# Patient Record
Sex: FEMALE | Race: WHITE | Employment: OTHER | URBAN - METROPOLITAN AREA
[De-identification: names, ages, dates, MRNs, and addresses within clinical notes are randomized per-mention and may not be internally consistent; named-entity substitution may affect disease eponyms.]

---

## 2017-03-03 ENCOUNTER — DOCUMENTATION ONLY (OUTPATIENT)
Dept: OTHER | Facility: CLINIC | Age: 72
End: 2017-03-03

## 2017-04-18 ENCOUNTER — IMPORTED ENCOUNTER (OUTPATIENT)
Dept: URBAN - METROPOLITAN AREA CLINIC 31 | Facility: CLINIC | Age: 72
End: 2017-04-18

## 2017-04-18 PROBLEM — H25.813: Noted: 2017-04-18

## 2017-04-18 PROBLEM — H10.403: Noted: 2017-04-18

## 2017-04-18 PROBLEM — L71.9: Noted: 2017-04-18

## 2017-04-18 PROCEDURE — 92014 COMPRE OPH EXAM EST PT 1/>: CPT

## 2017-04-18 PROCEDURE — 92015 DETERMINE REFRACTIVE STATE: CPT

## 2017-04-18 NOTE — PATIENT DISCUSSION
1.  Facial Rosacea - Patient found to have skin changes consistsnt with Rosacea. No sign of ocular complications related to rosacea at this time. Consider evaluation by dermatology. 2. Combined Types of Cataract OU: Explained how cataracts can effect vision. Recommend clinical observation. The patient was advised to contact us if any change or worsening of vision. 3. Allergic Conjunctivitis OU -- The condition was  discussed with the patient. Avoidance of allergens and cool compresses were recommended. Continue Zaditor PRN. 4. Consider rx to improve distance for PRN. 5. Return for an appointment in 1 year for comprehensive exam. with Dr. Edward Claros.

## 2017-05-24 NOTE — PROGRESS NOTES
SUBJECTIVE:                                                    Stephanie Kathleen is a 72 year old female who presents to clinic today for the following health issues:        Depression and Anxiety Follow-Up    Status since last visit: No change    Other associated symptoms:None    Complicating factors:     Significant life event: daughter Dx with MS she has lost her vision      Current substance abuse: None    PHQ-9 SCORE 9/8/2015 3/27/2016 7/8/2016   Total Score - - -   Total Score MyChart 1 5 (Mild depression) 4 (Minimal depression)     HAAKN-7 SCORE 3/13/2012 9/8/2015   Total Score 5 -   Total Score - 0        PHQ-9  English      PHQ-9   Any Language     GAD7     Lump on palm of right hand         Amount of exercise or physical activity: None, active     Problems taking medications regularly: No    Medication side effects: none    Diet: regular (no restrictions)      Hyperlipidemia Follow-Up      Rate your low fat/cholesterol diet?: good    Taking statin?  Yes, no muscle aches from statin    Other lipid medications/supplements?:  none     Hypertension Follow-up      Outpatient blood pressures are not being checked.    Low Salt Diet: no added salt    Hypertension ROS: taking medications as instructed, no medication side effects noted, no TIA's, no chest pain on exertion, no dyspnea on exertion, no swelling of ankles.  No headache or visual changes.      C/o right thigh pain in the muscle x 6 months, worsens with activity.  Can golf 9 holes, but pain starts by 10th hole, takes about an hour of rest for pain to resolve.  Does not resemble pain she had from statins.        Problem list and histories reviewed & adjusted, as indicated.  Additional history: as documented    Patient Active Problem List   Diagnosis     MS (multiple sclerosis) (H)     Mild major depression (H)     Basal cell carcinoma of skin     Hypertension goal BP (blood pressure) < 150/90     Anxiety     Adenocarcinoma of lung (H)     Squamous cell  carcinoma of lung (H)     Hyperlipidemia LDL goal <130     Pulmonary nodules     Centrilobular emphysema (H)     Advance Care Planning     Morbid obesity (H)     Past Surgical History:   Procedure Laterality Date     ABDOMEN SURGERY  1973,1975    C-sections     BIOPSY       C APPENDECTOMY       C TOTAL KNEE ARTHROPLASTY  01/2007    left knee     C TOTAL KNEE ARTHROPLASTY  1/10    right knee     C/SECTION, LOW TRANSVERSE      x 2     CARPAL TUNNEL RELEASE RT/LT      bilateral     CHOLECYSTECTOMY  2010     COLONOSCOPY  7/1/2013    Procedure: COLONOSCOPY;  colonoscopy       LOBECTOMY  02/2008    lower lobe       Social History   Substance Use Topics     Smoking status: Former Smoker     Packs/day: 1.00     Years: 15.00     Types: Cigarettes     Quit date: 5/12/1999     Smokeless tobacco: Never Used      Comment: quit in 1999     Alcohol use No     Family History   Problem Relation Age of Onset     CANCER Mother      ovarian     Hypertension Father      CANCER Father      Cardiovascular Father      HEART DISEASE Father      Depression Father      Arthritis Sister      CANCER Sister      ovarian and breast     Obesity Sister      Hypertension Sister      Alcohol/Drug Daughter      Depression Daughter      Arthritis Sister      CANCER Sister      Alcohol/Drug Daughter      Depression Daughter      CANCER Paternal Grandfather      lung     Obesity Paternal Grandfather      Obesity Paternal Grandmother      Breast Cancer Sister      Melanoma No family hx of          Current Outpatient Prescriptions   Medication Sig Dispense Refill     losartan-hydrochlorothiazide (HYZAAR) 100-25 MG per tablet Take 1 tablet by mouth daily 90 tablet 1     pravastatin (PRAVACHOL) 10 MG tablet Take 1 tablet every 3rd day 30 tablet 3     sertraline (ZOLOFT) 50 MG tablet Take 1 tablet (50 mg) by mouth daily 90 tablet 1     [DISCONTINUED] losartan-hydrochlorothiazide (HYZAAR) 100-25 MG per tablet Take 1 tablet by mouth daily 90 tablet 1      [DISCONTINUED] pravastatin (PRAVACHOL) 10 MG tablet Take 1 tablet every 3rd day 30 tablet 3     [DISCONTINUED] sertraline (ZOLOFT) 50 MG tablet Take 1 tablet (50 mg) by mouth daily 90 tablet 1     [DISCONTINUED] FLUoxetine (PROZAC) 40 MG capsule Take 1 capsule (40 mg) by mouth daily Need to keep upcoming appointment for further refills 30 capsule 0     BP Readings from Last 3 Encounters:   06/01/17 138/78   12/13/16 140/82   08/17/16 124/75    Wt Readings from Last 3 Encounters:   06/01/17 231 lb (104.8 kg)   12/13/16 231 lb (104.8 kg)   05/12/16 230 lb (104.3 kg)                  Labs reviewed in EPIC    Reviewed and updated as needed this visit by clinical staff       Reviewed and updated as needed this visit by Provider         {PROVIDER CHARTING PREFERENCE:[id number]}

## 2017-06-01 ENCOUNTER — TELEPHONE (OUTPATIENT)
Dept: OTHER | Facility: CLINIC | Age: 72
End: 2017-06-01

## 2017-06-01 ENCOUNTER — OFFICE VISIT (OUTPATIENT)
Dept: FAMILY MEDICINE | Facility: CLINIC | Age: 72
End: 2017-06-01
Payer: MEDICARE

## 2017-06-01 VITALS
SYSTOLIC BLOOD PRESSURE: 138 MMHG | BODY MASS INDEX: 38.49 KG/M2 | DIASTOLIC BLOOD PRESSURE: 78 MMHG | TEMPERATURE: 98.2 F | WEIGHT: 231 LBS | HEART RATE: 81 BPM | HEIGHT: 65 IN

## 2017-06-01 DIAGNOSIS — M79.651 PAIN OF RIGHT THIGH: ICD-10-CM

## 2017-06-01 DIAGNOSIS — M79.641 BILATERAL HAND PAIN: ICD-10-CM

## 2017-06-01 DIAGNOSIS — F33.0 MAJOR DEPRESSIVE DISORDER, RECURRENT EPISODE, MILD (H): Primary | ICD-10-CM

## 2017-06-01 DIAGNOSIS — I10 HYPERTENSION GOAL BP (BLOOD PRESSURE) < 150/90: ICD-10-CM

## 2017-06-01 DIAGNOSIS — E78.5 HYPERLIPIDEMIA LDL GOAL <100: ICD-10-CM

## 2017-06-01 DIAGNOSIS — I73.9 PAD (PERIPHERAL ARTERY DISEASE) (H): Primary | ICD-10-CM

## 2017-06-01 DIAGNOSIS — M79.642 BILATERAL HAND PAIN: ICD-10-CM

## 2017-06-01 PROBLEM — E66.01 MORBID OBESITY (H): Status: ACTIVE | Noted: 2017-06-01

## 2017-06-01 LAB
ANION GAP SERPL CALCULATED.3IONS-SCNC: 7 MMOL/L (ref 3–14)
BUN SERPL-MCNC: 13 MG/DL (ref 7–30)
CALCIUM SERPL-MCNC: 9.2 MG/DL (ref 8.5–10.1)
CHLORIDE SERPL-SCNC: 104 MMOL/L (ref 94–109)
CHOLEST SERPL-MCNC: 200 MG/DL
CO2 SERPL-SCNC: 30 MMOL/L (ref 20–32)
CREAT SERPL-MCNC: 0.91 MG/DL (ref 0.52–1.04)
GFR SERPL CREATININE-BSD FRML MDRD: 61 ML/MIN/1.7M2
GLUCOSE SERPL-MCNC: 97 MG/DL (ref 70–99)
HDLC SERPL-MCNC: 49 MG/DL
LDLC SERPL CALC-MCNC: 127 MG/DL
NONHDLC SERPL-MCNC: 151 MG/DL
POTASSIUM SERPL-SCNC: 4.5 MMOL/L (ref 3.4–5.3)
SODIUM SERPL-SCNC: 141 MMOL/L (ref 133–144)
TRIGL SERPL-MCNC: 119 MG/DL

## 2017-06-01 PROCEDURE — 99214 OFFICE O/P EST MOD 30 MIN: CPT | Performed by: FAMILY MEDICINE

## 2017-06-01 PROCEDURE — 80061 LIPID PANEL: CPT | Performed by: FAMILY MEDICINE

## 2017-06-01 PROCEDURE — 80048 BASIC METABOLIC PNL TOTAL CA: CPT | Performed by: FAMILY MEDICINE

## 2017-06-01 PROCEDURE — 36415 COLL VENOUS BLD VENIPUNCTURE: CPT | Performed by: FAMILY MEDICINE

## 2017-06-01 RX ORDER — LOSARTAN POTASSIUM AND HYDROCHLOROTHIAZIDE 25; 100 MG/1; MG/1
1 TABLET ORAL DAILY
Qty: 90 TABLET | Refills: 1 | Status: SHIPPED | OUTPATIENT
Start: 2017-06-01 | End: 2018-01-09

## 2017-06-01 RX ORDER — PRAVASTATIN SODIUM 10 MG
TABLET ORAL
Qty: 30 TABLET | Refills: 3 | Status: SHIPPED | OUTPATIENT
Start: 2017-06-01 | End: 2018-06-18

## 2017-06-01 ASSESSMENT — ANXIETY QUESTIONNAIRES
2. NOT BEING ABLE TO STOP OR CONTROL WORRYING: NOT AT ALL
1. FEELING NERVOUS, ANXIOUS, OR ON EDGE: NOT AT ALL
3. WORRYING TOO MUCH ABOUT DIFFERENT THINGS: SEVERAL DAYS
5. BEING SO RESTLESS THAT IT IS HARD TO SIT STILL: NOT AT ALL
IF YOU CHECKED OFF ANY PROBLEMS ON THIS QUESTIONNAIRE, HOW DIFFICULT HAVE THESE PROBLEMS MADE IT FOR YOU TO DO YOUR WORK, TAKE CARE OF THINGS AT HOME, OR GET ALONG WITH OTHER PEOPLE: NOT DIFFICULT AT ALL
GAD7 TOTAL SCORE: 2
6. BECOMING EASILY ANNOYED OR IRRITABLE: NOT AT ALL
7. FEELING AFRAID AS IF SOMETHING AWFUL MIGHT HAPPEN: SEVERAL DAYS

## 2017-06-01 ASSESSMENT — PATIENT HEALTH QUESTIONNAIRE - PHQ9: 5. POOR APPETITE OR OVEREATING: NOT AT ALL

## 2017-06-01 NOTE — MR AVS SNAPSHOT
After Visit Summary   6/1/2017    Stephanie Kathleen    MRN: [id number]           Patient Information     Date Of Birth          1945        Visit Information        Provider Department      6/1/2017 9:15 AM Stephanie Olsen MD [facility]        Today's Diagnoses     Major depressive disorder, recurrent episode, mild (H)    -  1    Hypertension goal BP (blood pressure) < 150/90        Hyperlipidemia LDL goal <100        Bilateral hand pain        Pain of right thigh          Care Instructions    Next visit in 6 months for wellness exam      Make appointments with rheumatology and with vascular specialist.                Follow-ups after your visit        Additional Services     RHEUMATOLOGY REFERRAL       Your provider has referred you to: [facility]: [facility] Oma Lux [phone]   [email or web address]    Please be aware that coverage of these services is subject to the terms and limitations of your health insurance plan.  Call member services at your health plan with any benefit or coverage questions.      Please bring the following with you to your appointment:    (1) Any X-Rays, CTs or MRIs which have been performed.  Contact the facility where they were done to arrange for  prior to your scheduled appointment.    (2) List of current medications   (3) This referral request   (4) Any documents/labs given to you for this referral            VASCULAR REFERRAL       Your provider has referred [facility] to [facility].    Reason for Referral: Peripheral Intervention    Urgency of Referral: Next available    Please be aware that coverage of these services is subject to the terms and limitations of your health insurance plan.  Call member services at your health plan with any benefit or coverage questions.      Please bring the following with you to your appointment:  (1) Any X-Rays, CTs or MRIs which have been performed.  Contact  the facility where they were done to arrange for  prior to your scheduled appointment.  Any new CT, MRI or other procedures ordered by your specialist must be performed at a [facility] facility or coordinated by your clinic's referral office.    (2) List of current medications   (3) This referral request   (4) Any documents/labs given to you for this referral                  Your next 10 appointments already scheduled     Jun 06, 2017 11:15 AM CDT   Return Visit with Carlos Hickman MD   [facility]    [address] [id number] [phone]              Who to contact     If you have questions or need follow up information about today's clinic visit or your schedule please contact [facility] directly at [phone].  Normal or non-critical lab and imaging results will be communicated to you by MyChart, letter or phone within 4 business days after the clinic has received the results. If you do not hear from us within 7 days, please contact the clinic through MyChart or phone. If you have a critical or abnormal lab result, we will notify you by phone as soon as possible.  Submit refill requests through [employer] or call your pharmacy and they will forward the refill request to us. Please allow 3 business days for your refill to be completed.          Additional Information About Your Visit        [facility] Information     [employer] gives you secure access to your electronic health record. If you see a primary care provider, you can also send messages to your care team and make appointments. If you have questions, please call your primary care clinic.  If you do not have a primary care provider, please call [phone] and they will assist you.        Care EveryWhere ID     This is your Care EveryWhere ID. This could be used by other organizations to access your [facility] medical records  [id number]        Your Vitals Were     Pulse  "Temperature Height BMI (Body Mass Index)          81 98.2  F (36.8  C) (Oral) 5' 4.5\" (1.638 m) 39.04 kg/m2         Blood Pressure from Last 3 Encounters:   06/01/17 138/78   12/13/16 140/82   08/17/16 124/75    Weight from Last 3 Encounters:   06/01/17 231 lb (104.8 kg)   12/13/16 231 lb (104.8 kg)   05/12/16 230 lb (104.3 kg)              We Performed the Following     Basic metabolic panel     DEPRESSION ACTION PLAN (DAP)     Lipid panel reflex to direct LDL     RHEUMATOLOGY REFERRAL     VASCULAR REFERRAL          Where to get your medicines      Some of these will need a paper prescription and others can be bought over the counter.  Ask your nurse if you have questions.     Bring a paper prescription for each of these medications     losartan-hydrochlorothiazide 100-25 MG per tablet    pravastatin 10 MG tablet    sertraline 50 MG tablet          Primary Care Provider Office Phone # Fax #    Stephanie Olsen [phone]       [facility] [address] [facility] [address]        Thank you!     Thank you for choosing [facility]  for your care. Our goal is always to provide you with excellent care. Hearing back from our patients is one way we can continue to improve our services. Please take a few minutes to complete the written survey that you may receive in the mail after your visit with us. Thank you!             Your Updated Medication List - Protect others around you: Learn how to safely use, store and throw away your medicines at www.disposemymeds.org.          This list is accurate as of: 6/1/17  9:48 AM.  Always use your most recent med list.                   Brand Name Dispense Instructions for use    losartan-hydrochlorothiazide 100-25 MG per tablet    HYZAAR    90 tablet    Take 1 tablet by mouth daily       pravastatin 10 MG tablet    PRAVACHOL    30 tablet    Take 1 tablet every 3rd day       sertraline 50 MG tablet    ZOLOFT    90 tablet    Take 1 " tablet (50 mg) by mouth daily

## 2017-06-01 NOTE — LETTER
My Depression Action Plan  Name: Stephanie Kathleen   Date of Birth 1945  Date: 5/24/2017    My doctor: Stephanie Olsen   My clinic: [facility]  [address] [place] [id number] [phone]          GREEN    ZONE   Good Control    What it looks like:     Things are going generally well. You have normal up s and down s. You may even feel depressed from time to time, but bad moods usually last less than a day.   What you need to do:  1. Continue to care for yourself (see self care plan)  2. Check your depression survival kit and update it as needed  3. Follow your physician s recommendations including any medication.  4. Do not stop taking medication unless you consult with your physician first.           YELLOW         ZONE Getting Worse    What it looks like:     Depression is starting to interfere with your life.     It may be hard to get out of bed; you may be starting to isolate yourself from others.    Symptoms of depression are starting to last most all day and this has happened for several days.     You may have suicidal thoughts but they are not constant.   What you need to do:     1. Call your care team, your response to treatment will improve if you keep your care team informed of your progress. Yellow periods are signs an adjustment may need to be made.     2. Continue your self-care, even if you have to fake it!    3. Talk to someone in your support network    4. Open up your depression survival kit           RED    ZONE Medical Alert - Get Help    What it looks like:     Depression is seriously interfering with your life.     You may experience these or other symptoms: You can t get out of bed most days, can t work or engage in other necessary activities, you have trouble taking care of basic hygiene, or basic responsibilities, thoughts of suicide or death that will not go away, self-injurious behavior.     What you need to do:  1. Call your care team and  request a same-day appointment. If they are not available (weekends or after hours) call your local crisis line, emergency room or 911.      Electronically signed by: Sujey Lancaster, May 24, 2017    Depression Self Care Plan / Survival Kit    Self-Care for Depression  Here s the deal. Your body and mind are really not as separate as most people think.  What you do and think affects how you feel and how you feel influences what you do and think. This means if you do things that people who feel good do, it will help you feel better.  Sometimes this is all it takes.  There is also a place for medication and therapy depending on how severe your depression is, so be sure to consult with your medical provider and/ or Behavioral Health Consultant if your symptoms are worsening or not improving.     In order to better manage my stress, I will:    Exercise  Get some form of exercise, every day. This will help reduce pain and release endorphins, the  feel good  chemicals in your brain. This is almost as good as taking antidepressants!  This is not the same as joining a gym and then never going! (they count on that by the way ) It can be as simple as just going for a walk or doing some gardening, anything that will get you moving.      Hygiene   Maintain good hygiene (Get out of bed in the morning, Make your bed, Brush your teeth, Take a shower, and Get dressed like you were going to work, even if you are unemployed).  If your clothes don't fit try to get ones that do.    Diet  I will strive to eat foods that are good for me, drink plenty of water, and avoid excessive sugar, caffeine, alcohol, and other mood-altering substances.  Some foods that are helpful in depression are: complex carbohydrates, B vitamins, flaxseed, fish or fish oil, fresh fruits and vegetables.    Psychotherapy  I agree to participate in Individual Therapy (if recommended).    Medication  If prescribed medications, I agree to take them.  Missing doses  can result in serious side effects.  I understand that drinking alcohol, or other illicit drug use, may cause potential side effects.  I will not stop my medication abruptly without first discussing it with my provider.    Staying Connected With Others  I will stay in touch with my friends, family members, and my primary care provider/team.    Use your imagination  Be creative.  We all have a creative side; it doesn t matter if it s oil painting, sand castles, or mud pies! This will also kick up the endorphins.    Witness Beauty  (AKA stop and smell the roses) Take a look outside, even in mid-winter. Notice colors, textures. Watch the squirrels and birds.     Service to others  Be of service to others.  There is always someone else in need.  By helping others we can  get out of ourselves  and remember the really important things.  This also provides opportunities for practicing all the other parts of the program.    Humor  Laugh and be silly!  Adjust your TV habits for less news and crime-drama and more comedy.    Control your stress  Try breathing deep, massage therapy, biofeedback, and meditation. Find time to relax each day.     My support system    Clinic Contact:  Phone number:    Contact 1:  Phone number:    Contact 2:  Phone number:    [facility]/:  Phone number:    Therapist:  Phone number:    Local crisis center:    Phone number:    Other community support:  Phone number:

## 2017-06-01 NOTE — TELEPHONE ENCOUNTER
Pt referred to [facility] by  for right thigh pain with activity, better with rest.    Pt needs to be scheduled for MARY ELLEN with exercise and consult with IR or Vascular Surgery.  Will route to scheduling to coordinate an appointment next available.    Danielle Vegas RN BSN

## 2017-06-01 NOTE — PATIENT INSTRUCTIONS
Next visit in 6 months for wellness exam      Make appointments with rheumatology and with vascular specialist.

## 2017-06-01 NOTE — NURSING NOTE
"Chief Complaint   Patient presents with     Depression     Anxiety     Derm Problem       Initial /78  Pulse 81  Temp 98.2  F (36.8  C) (Oral)  Ht 5' 4.5\" (1.638 m)  Wt 231 lb (104.8 kg)  BMI 39.04 kg/m2 Estimated body mass index is 39.04 kg/(m^2) as calculated from the following:    Height as of this encounter: 5' 4.5\" (1.638 m).    Weight as of this encounter: 231 lb (104.8 kg).  Medication Reconciliation: complete  Sujey Pierson , DANICA     "

## 2017-06-02 ASSESSMENT — ANXIETY QUESTIONNAIRES: GAD7 TOTAL SCORE: 2

## 2017-06-02 ASSESSMENT — PATIENT HEALTH QUESTIONNAIRE - PHQ9: SUM OF ALL RESPONSES TO PHQ QUESTIONS 1-9: 3

## 2017-06-02 NOTE — TELEPHONE ENCOUNTER
Left message with patient's  who took my name and number. Patient will call back today or Monday to schedule.

## 2017-06-06 ENCOUNTER — TELEPHONE (OUTPATIENT)
Dept: DERMATOLOGY | Facility: CLINIC | Age: 72
End: 2017-06-06

## 2017-06-06 ENCOUNTER — OFFICE VISIT (OUTPATIENT)
Dept: DERMATOLOGY | Facility: CLINIC | Age: 72
End: 2017-06-06
Payer: MEDICARE

## 2017-06-06 VITALS — DIASTOLIC BLOOD PRESSURE: 85 MMHG | SYSTOLIC BLOOD PRESSURE: 151 MMHG | OXYGEN SATURATION: 95 % | HEART RATE: 69 BPM

## 2017-06-06 DIAGNOSIS — B07.8 COMMON WART: Primary | ICD-10-CM

## 2017-06-06 DIAGNOSIS — L82.1 SK (SEBORRHEIC KERATOSIS): ICD-10-CM

## 2017-06-06 DIAGNOSIS — L82.0 INFLAMED SEBORRHEIC KERATOSIS: ICD-10-CM

## 2017-06-06 DIAGNOSIS — C44.319 BASAL CELL CARCINOMA, FOREHEAD: ICD-10-CM

## 2017-06-06 DIAGNOSIS — L81.4 LENTIGO: ICD-10-CM

## 2017-06-06 DIAGNOSIS — Z85.828 HISTORY OF SKIN CANCER: ICD-10-CM

## 2017-06-06 DIAGNOSIS — C44.01 BASAL CELL CARCINOMA, LIP: ICD-10-CM

## 2017-06-06 PROCEDURE — 11101 HC DESTRUCT BENIGN LESION, UP TO 14: CPT | Performed by: DERMATOLOGY

## 2017-06-06 PROCEDURE — 17110 DESTRUCTION B9 LES UP TO 14: CPT | Performed by: DERMATOLOGY

## 2017-06-06 PROCEDURE — 99213 OFFICE O/P EST LOW 20 MIN: CPT | Mod: 25 | Performed by: DERMATOLOGY

## 2017-06-06 PROCEDURE — 88331 PATH CONSLTJ SURG 1 BLK 1SPC: CPT | Performed by: DERMATOLOGY

## 2017-06-06 PROCEDURE — 40490 BIOPSY OF LIP: CPT | Performed by: DERMATOLOGY

## 2017-06-06 PROCEDURE — 11100 CL FROZEN SECTION FIRST SPEC: CPT | Mod: 59 | Performed by: DERMATOLOGY

## 2017-06-06 NOTE — PROGRESS NOTES
Stephanie Kathleen is a 72 year old year old female patient here today for bleeding spot on forehead and lip, tender spot on right knee, tender wart on right hand.   .  Patient states this has been present for months.  Patient reports the following symptoms:  bleeding.  Patient reports the following previous treatments none.  Patient reports the following modifying factors none.  Associated symptoms: none.  Patient has no other skin complaints today.  Remainder of the HPI, Meds, PMH, Allergies, FH, and SH was reviewed in chart.    Pertinent Hx:   Non-melanoma skin cancer   Past Medical History:   Diagnosis Date     Arthritis      Basal cell carcinoma      COPD (chronic obstructive pulmonary disease) (H)      Depression      Depressive disorder     MS related     HTN      Hyperlipidemia      Lung cancer (H)      MS (multiple sclerosis) (H)      Squamous cell carcinoma (H)      TIA (transient ischaemic attack)        Past Surgical History:   Procedure Laterality Date     ABDOMEN SURGERY  1973,1975    C-sections     BIOPSY       C APPENDECTOMY       C TOTAL KNEE ARTHROPLASTY  01/2007    left knee     C TOTAL KNEE ARTHROPLASTY  1/10    right knee     C/SECTION, LOW TRANSVERSE      x 2     CARPAL TUNNEL RELEASE RT/LT      bilateral     CHOLECYSTECTOMY  2010     COLONOSCOPY  7/1/2013    Procedure: COLONOSCOPY;  colonoscopy       LOBECTOMY  02/2008    lower lobe        Family History   Problem Relation Age of Onset     CANCER Mother      ovarian     Hypertension Father      CANCER Father      Cardiovascular Father      HEART DISEASE Father      Depression Father      Arthritis Sister      CANCER Sister      ovarian and breast     Obesity Sister      Hypertension Sister      Alcohol/Drug Daughter      Depression Daughter      Arthritis Sister      CANCER Sister      Alcohol/Drug Daughter      Depression Daughter      CANCER Paternal Grandfather      lung     Obesity Paternal Grandfather      Obesity Paternal Grandmother       Breast Cancer Sister      Melanoma No family hx of        Social History     Social History     Marital status:      Spouse name: N/A     Number of children: N/A     Years of education: N/A     Occupational History      Retired     Social History Main Topics     Smoking status: Former Smoker     Packs/day: 1.00     Years: 15.00     Types: Cigarettes     Quit date: 5/12/1999     Smokeless tobacco: Never Used      Comment: quit in 1999     Alcohol use No     Drug use: No     Sexual activity: No     Other Topics Concern     Parent/Sibling W/ Cabg, Mi Or Angioplasty Before 65f 55m? No     Social History Narrative       Outpatient Encounter Prescriptions as of 6/6/2017   Medication Sig Dispense Refill     losartan-hydrochlorothiazide (HYZAAR) 100-25 MG per tablet Take 1 tablet by mouth daily 90 tablet 1     pravastatin (PRAVACHOL) 10 MG tablet Take 1 tablet every 3rd day 30 tablet 3     sertraline (ZOLOFT) 50 MG tablet Take 1 tablet (50 mg) by mouth daily 90 tablet 1     No facility-administered encounter medications on file as of 6/6/2017.              Review Of Systems  Skin: As above  Eyes: negative  Ears/Nose/Throat: negative  Respiratory: No shortness of breath, dyspnea on exertion, cough, or hemoptysis  Cardiovascular: negative  Gastrointestinal: negative  Genitourinary: negative  Musculoskeletal: negative  Neurologic: negative  Psychiatric: negative  Hematologic/Lymphatic/Immunologic: negative  Endocrine: negative      O:   NAD, WDWN, Alert & Oriented, Mood & Affect wnl, Vitals stable   Here today alone   /85 (BP Location: Right arm, Patient Position: Chair, Cuff Size: Adult Regular)  Pulse 69  SpO2 95%   General appearance normal   Vitals stable   Alert, oriented and in no acute distress      Following lymph nodes palpated: Occipital, Cervical, Supraclavicular no lad   R 3rd finge rverrucous papule   Stuck on papules and brown macules on trunk and ext    R forehead 9mm pink pearly papule   L lip at  vermillion 4mm pink pearly papule   R knee tender 1cm scaly papule           The remainder of the full exam was unremarkable; the following areas were examined:  conjunctiva/lids, oral mucosa, neck, peripheral vascular system, abdomen, lymph nodes, digits/nails, eccrine and apocrine glands, scalp/hair, face, neck, chest, abdomen, buttocks, back, RUE, LUE, RLE, LLE       Eyes: Conjunctivae/lids:Normal     ENT: Lips, buccal mucosa, tongue: normal    MSK:Normal    Cardiovascular: peripheral edema none    Pulm: Breathing Normal    Lymph Nodes: No Head and Neck Lymphadenopathy     Neuro/Psych: Orientation:Normal; Mood/Affect:Normal      MICRO:     R forehead:Orthokeratosis of epidermis with a proliferation of nests of basaloid cells, with peripheral palisading and a haphazard arrangement in the center extending into the dermis, forming nodules.  The tumor cells have hyperchromatic nuclei. Poor cytoplasm and intercellular bridging.    L lip a V:Orthokeratosis of epidermis with a proliferation of nests of basaloid cells, with peripheral palisading and a haphazard arrangement in the center extending into the dermis, forming nodules.  The tumor cells have hyperchromatic nuclei. Poor cytoplasm and intercellular bridging.    R knee:Sharply demarcated exophytic epidermal growth composed of sheets of small cells basaloid cells with variable melanin pigmentation.  There are keratin-filled cysts throughout.  The dermis is overall unremarkable with a bland monomorphic inflammatory infiltrate.      A/P:  1. Seborrheic keratosis, lentigo, hx of non-melanoma skin cancer   2. R finger wart  LN2:  Treated with LN2 for 5s for 1-2 cycles. Warned risks of blistering, pain, pigment change, scarring, and incomplete resolution.  Advised patient to return if lesions do not completely resolve.  Wound care sheet given.  3. R/o basal cell carcinoma   TANGENTIAL BIOPSY IN HOUSE:  After consent, anesthesia with LEC and prep, tangential excision  performed and dx above confirmed with frozen section histology.  No complications and routine wound care.  Patient told result   R forehead basal cell carcinoma schedule  L lip basal cell carcinoma schedule  R knee inflamed seborrheic keratosis no treatment      BENIGN LESIONS DISCUSSED WITH PATIENT:  I discussed the specifics of tumor, prognosis, and genetics of benign lesions.  I explained that treatment of these lesions would be purely cosmetic and not medically neccessary.  I discussed with patient different removal options including excision, cautery and /or laser.      Nature and genetics of benign skin lesions dicussed with patient.  Signs and Symptoms of skin cancer discussed with patient.  ABCDEs of melanoma reviewed with patient.  Patient encouraged to perform monthly skin exams.  UV precautions reviewed with patient.  Skin care regimen reviewed with patient: Eliminate harsh soaps, i.e. Dial, zest, irsih spring; Mild soaps such as Cetaphil or Dove sensitive skin, avoid hot or cold showers, aggressive use of emollients including vanicream, cetaphil or cerave discussed with patient.    Risks of non-melanoma skin cancer discussed with patient   Return to clinic 6 months

## 2017-06-06 NOTE — NURSING NOTE
"Initial /85 (BP Location: Right arm, Patient Position: Chair, Cuff Size: Adult Regular)  Pulse 69  SpO2 95% Estimated body mass index is 39.04 kg/(m^2) as calculated from the following:    Height as of 6/1/17: 1.638 m (5' 4.5\").    Weight as of 6/1/17: 104.8 kg (231 lb). .      "

## 2017-06-06 NOTE — TELEPHONE ENCOUNTER
----- Message from Carlos Hickman MD sent at 6/6/2017 12:15 PM CDT -----  R forehead basal cell carcinoma schedule  L lip basal cell carcinoma schedule  R knee inflamed seborrheic keratosis no treatment

## 2017-06-06 NOTE — LETTER
[facility]  [address] [address]  Phone: [phone]    June 6, 2017    Stephanie K Hever  [address] [id number]              Dear Ms. Kathleen,    You are scheduled for Mohs Surgery on     Please check in at Dermatology Clinic.   (2nd Floor, last  Clinic on right up staircase or elevator -past OB/GYN clinic)    You don't need to arrive more than 5-10 minutes prior to your appointment time.     Be sure to eat a good breakfast and bathe and wash your hair prior to Surgery.    If you are taking any anti-coagulants that are prescribed by your Doctor (such as Coumadin/warfarin, Plavix, Aspirin, Ibuprofen), please continue taking them.     However, If you are taking anti-coagulants over the counter without  a Doctor's order for a Medical condition, please discontinue them 10 days prior to Surgery.      Please wear loose comfortable clothing as it could possibly be 4-6 hours until your surgery is completed depending upon how many layers of tissue need to be removed.     Wi-fi access is available.     Sincerely,      Carlos Hickman MD/ Farzana Cagle RN

## 2017-06-06 NOTE — PATIENT INSTRUCTIONS
Wound Care Instructions     FOR SUPERFICIAL WOUNDS     [facility] [phone]    [facility] [phone]      Right forehead, lip, and right leg                   AFTER 24 HOURS YOU SHOULD REMOVE THE BANDAGE AND BEGIN DAILY DRESSING CHANGES AS FOLLOWS:     1) Remove Dressing.     2) Clean and dry the area with tap water using a Q-tip or sterile gauze pad.     3) Apply Vaseline, Aquaphor, Polysporin ointment or Bacitracin ointment over entire wound.  Do NOT use Neosporin ointment.     4) Cover the wound with a band-aid, or a sterile non-stick gauze pad and micropore paper tape      REPEAT THESE INSTRUCTIONS AT LEAST ONCE A DAY UNTIL THE WOUND HAS COMPLETELY HEALED.    It is an old wives tale that a wound heals better when it is exposed to air and allowed to dry out. The wound will heal faster with a better cosmetic result if it is kept moist with ointment and covered with a bandage.    **Do not let the wound dry out.**      Supplies Needed:      *Cotton tipped applicators (Q-tips)    *Polysporin Ointment or Bacitracin Ointment (NOT NEOSPORIN)    *Band-aids or non-stick gauze pads and micropore paper tape.      PATIENT INFORMATION:    During the healing process you will notice a number of changes. All wounds develop a small halo of redness surrounding the wound.  This means healing is occurring. Severe itching with extensive redness usually indicates sensitivity to the ointment or bandage tape used to dress the wound.  You should call our office if this develops.      Swelling  and/or discoloration around your surgical site is common, particularly when performed around the eye.    All wounds normally drain.  The larger the wound the more drainage there will be.  After 7-10 days, you will notice the wound beginning to shrink and new skin will begin to grow.  The wound is healed when you can see skin has formed over the entire area.  A healed wound has a healthy, shiny look to the  surface and is red to dark pink in color to normalize.  Wounds may take approximately 4-6 weeks to heal.  Larger wounds may take 6-8 weeks.  After the wound is healed you may discontinue dressing changes.    You may experience a sensation of tightness as your wound heals. This is normal and will gradually subside.    Your healed wound may be sensitive to temperature changes. This sensitivity improves with time, but if you re having a lot of discomfort, try to avoid temperature extremes.    Patients frequently experience itching after their wound appears to have healed because of the continue healing under the skin.  Plain Vaseline will help relieve the itching.        POSSIBLE COMPLICATIONS    BLEEDIN. Leave the bandage in place.  2. Use tightly rolled up gauze or a cloth to apply direct pressure over the bandage for 30  minutes.  3. Reapply pressure for an additional 30 minutes if necessary  4. Use additional gauze and tape to maintain pressure once the bleeding has stopped.

## 2017-06-06 NOTE — MR AVS SNAPSHOT
After Visit Summary   6/6/2017    Stephanie Kathleen    MRN: [id number]           Patient Information     Date Of Birth          1945        Visit Information        Provider Department      6/6/2017 11:15 AM Carlos Hickman MD [facility]        Care Instructions          Wound Care Instructions     FOR SUPERFICIAL WOUNDS     [facility] [phone]    [facility] [phone]      Right forehead, lip, and right leg                   AFTER 24 HOURS YOU SHOULD REMOVE THE BANDAGE AND BEGIN DAILY DRESSING CHANGES AS FOLLOWS:     1) Remove Dressing.     2) Clean and dry the area with tap water using a Q-tip or sterile gauze pad.     3) Apply Vaseline, Aquaphor, Polysporin ointment or Bacitracin ointment over entire wound.  Do NOT use Neosporin ointment.     4) Cover the wound with a band-aid, or a sterile non-stick gauze pad and micropore paper tape      REPEAT THESE INSTRUCTIONS AT LEAST ONCE A DAY UNTIL THE WOUND HAS COMPLETELY HEALED.    It is an old wives tale that a wound heals better when it is exposed to air and allowed to dry out. The wound will heal faster with a better cosmetic result if it is kept moist with ointment and covered with a bandage.    **Do not let the wound dry out.**      Supplies Needed:      *Cotton tipped applicators (Q-tips)    *Polysporin Ointment or Bacitracin Ointment (NOT NEOSPORIN)    *Band-aids or non-stick gauze pads and micropore paper tape.      PATIENT INFORMATION:    During the healing process you will notice a number of changes. All wounds develop a small halo of redness surrounding the wound.  This means healing is occurring. Severe itching with extensive redness usually indicates sensitivity to the ointment or bandage tape used to dress the wound.  You should call our office if this develops.      Swelling  and/or discoloration around your surgical site is common, particularly when performed around the eye.    All  wounds normally drain.  The larger the wound the more drainage there will be.  After 7-10 days, you will notice the wound beginning to shrink and new skin will begin to grow.  The wound is healed when you can see skin has formed over the entire area.  A healed wound has a healthy, shiny look to the surface and is red to dark pink in color to normalize.  Wounds may take approximately 4-6 weeks to heal.  Larger wounds may take 6-8 weeks.  After the wound is healed you may discontinue dressing changes.    You may experience a sensation of tightness as your wound heals. This is normal and will gradually subside.    Your healed wound may be sensitive to temperature changes. This sensitivity improves with time, but if you re having a lot of discomfort, try to avoid temperature extremes.    Patients frequently experience itching after their wound appears to have healed because of the continue healing under the skin.  Plain Vaseline will help relieve the itching.        POSSIBLE COMPLICATIONS    BLEEDIN. Leave the bandage in place.  2. Use tightly rolled up gauze or a cloth to apply direct pressure over the bandage for 30  minutes.  3. Reapply pressure for an additional 30 minutes if necessary  4. Use additional gauze and tape to maintain pressure once the bleeding has stopped.              Follow-ups after your visit        Your next 10 appointments already scheduled     2017 10:45 AM CDT   US MARY ELLEN DOPPLER WITH EXERCISE with W[facility]US[facility]   [facility] ([facility])    [address] [address] [phone]           Please bring a list of your medicines (including vitamins, minerals and over-the-counter drugs). Also, tell your doctor about any allergies you may have. Wear comfortable clothes and leave your valuables at home.  No caffeine or tobacco for 1 hour prior to exam.  Please call the Imaging Department at your exam site with any questions.              2017 11:30 AM CDT   New Visit with Ej Montano MD   [facility]    [address] [address] [phone]              Who to contact     If you have questions or need follow up information about today's clinic visit or your schedule please contact [facility] directly at [phone].  Normal or non-critical lab and imaging results will be communicated to you by [email or web address], letter or phone within 4 business days after the clinic has received the results. If you do not hear from us within 7 days, please contact the clinic through [employer] or phone. If you have a critical or abnormal lab result, we will notify you by phone as soon as possible.  Submit refill requests through [employer] or call your pharmacy and they will forward the refill request to us. Please allow 3 business days for your refill to be completed.          Additional Information About Your Visit        [employer] Information     [employer] gives you secure access to your electronic health record. If you see a primary care provider, you can also send messages to your care team and make appointments. If you have questions, please call your primary care clinic.  If you do not have a primary care provider, please call [phone] and they will assist you.        Care EveryWhere ID     This is your Care EveryWhere ID. This could be used by other organizations to access your [facility] medical records  [id number]        Your Vitals Were     Pulse Pulse Oximetry                69 95%           Blood Pressure from Last 3 Encounters:   06/06/17 151/85   06/01/17 138/78   12/13/16 140/82    Weight from Last 3 Encounters:   06/01/17 104.8 kg (231 lb)   12/13/16 104.8 kg (231 lb)   05/12/16 104.3 kg (230 lb)              Today, you had the following     No orders found for display       Primary Care Provider Office Phone # Fax #    Stephanie Olsen [phone]        [facility] [address] [facility] [address]        Thank you!     Thank you for choosing [facility]  for your care. Our goal is always to provide you with excellent care. Hearing back from our patients is one way we can continue to improve our services. Please take a few minutes to complete the written survey that you may receive in the mail after your visit with us. Thank you!             Your Updated Medication List - Protect others around you: Learn how to safely use, store and throw away your medicines at www.disposemymeds.org.          This list is accurate as of: 6/6/17 11:37 AM.  Always use your most recent med list.                   Brand Name Dispense Instructions for use    losartan-hydrochlorothiazide 100-25 MG per tablet    HYZAAR    90 tablet    Take 1 tablet by mouth daily       pravastatin 10 MG tablet    PRAVACHOL    30 tablet    Take 1 tablet every 3rd day       sertraline 50 MG tablet    ZOLOFT    90 tablet    Take 1 tablet (50 mg) by mouth daily

## 2017-06-07 NOTE — TELEPHONE ENCOUNTER
Pt notified of below.  Pt reports understanding.  Pt does not have further questions or concerns.  Patient transferred to scheduling for appointments for excision.    Nivia Shoemaker   Ob/Gyn Clinic  RN

## 2017-06-18 NOTE — PROGRESS NOTES
SUBJECTIVE:                                                    Stephanie Kathleen is a 72 year old female who presents to clinic today for the following health issues:        Depression and Anxiety Follow-Up    Status since last visit: No change    Other associated symptoms:None    Complicating factors:     Significant life event: daughter Dx with MS she has lost her vision      Current substance abuse: None    PHQ-9 SCORE 3/27/2016 7/8/2016 6/1/2017   Total Score - - -   Total Score MyChart 5 (Mild depression) 4 (Minimal depression) -   Total Score - - 3     HAKAN-7 SCORE 3/13/2012 9/8/2015 6/1/2017   Total Score 5 - -   Total Score - 0 -   Total Score - - 2        PHQ-9  English      PHQ-9   Any Language     GAD7     Lump on palm of right hand not enlarging, not causing pain or interfering with function  Bilateral generalized hand pain with redness and swelling at times.  Not related to lump        Amount of exercise or physical activity: None, active     Problems taking medications regularly: No    Medication side effects: none    Diet: regular (no restrictions)      Hyperlipidemia Follow-Up      Rate your low fat/cholesterol diet?: good    Taking statin?  Yes, no muscle aches from statin    Other lipid medications/supplements?:  none     Hypertension Follow-up      Outpatient blood pressures are not being checked.    Low Salt Diet: no added salt    Hypertension ROS: taking medications as instructed, no medication side effects noted, no TIA's, no chest pain on exertion, no dyspnea on exertion, no swelling of ankles.  No headache or visual changes.      C/o right thigh pain in the muscle x 6 months, worsens with activity.  Can golf 9 holes, but pain starts by 10th hole, takes about an hour of rest for pain to resolve.  Does not resemble pain she had from statins.        Problem list and histories reviewed & adjusted, as indicated.  Additional history: as documented    Patient Active Problem List   Diagnosis     MS  (multiple sclerosis) (H)     Mild major depression (H)     Basal cell carcinoma of skin     Hypertension goal BP (blood pressure) < 150/90     Anxiety     Adenocarcinoma of lung (H)     Squamous cell carcinoma of lung (H)     Hyperlipidemia LDL goal <130     Pulmonary nodules     Centrilobular emphysema (H)     Advance Care Planning     Morbid obesity (H)     Past Surgical History:   Procedure Laterality Date     ABDOMEN SURGERY  1973,1975    C-sections     BIOPSY       C APPENDECTOMY       C TOTAL KNEE ARTHROPLASTY  01/2007    left knee     C TOTAL KNEE ARTHROPLASTY  1/10    right knee     C/SECTION, LOW TRANSVERSE      x 2     CARPAL TUNNEL RELEASE RT/LT      bilateral     CHOLECYSTECTOMY  2010     COLONOSCOPY  7/1/2013    Procedure: COLONOSCOPY;  colonoscopy       LOBECTOMY  02/2008    lower lobe       Social History   Substance Use Topics     Smoking status: Former Smoker     Packs/day: 1.00     Years: 15.00     Types: Cigarettes     Quit date: 5/12/1999     Smokeless tobacco: Never Used      Comment: quit in 1999     Alcohol use No     Family History   Problem Relation Age of Onset     CANCER Mother      ovarian     Hypertension Father      CANCER Father      Cardiovascular Father      HEART DISEASE Father      Depression Father      Arthritis Sister      CANCER Sister      ovarian and breast     Obesity Sister      Hypertension Sister      Alcohol/Drug Daughter      Depression Daughter      Arthritis Sister      CANCER Sister      Alcohol/Drug Daughter      Depression Daughter      CANCER Paternal Grandfather      lung     Obesity Paternal Grandfather      Obesity Paternal Grandmother      Breast Cancer Sister      Melanoma No family hx of          Current Outpatient Prescriptions   Medication Sig Dispense Refill     losartan-hydrochlorothiazide (HYZAAR) 100-25 MG per tablet Take 1 tablet by mouth daily 90 tablet 1     pravastatin (PRAVACHOL) 10 MG tablet Take 1 tablet every 3rd day 30 tablet 3     sertraline  "(ZOLOFT) 50 MG tablet Take 1 tablet (50 mg) by mouth daily 90 tablet 1     [DISCONTINUED] FLUoxetine (PROZAC) 40 MG capsule Take 1 capsule (40 mg) by mouth daily Need to keep upcoming appointment for further refills 30 capsule 0     BP Readings from Last 3 Encounters:   06/06/17 151/85   06/01/17 138/78   12/13/16 140/82    Wt Readings from Last 3 Encounters:   06/01/17 231 lb (104.8 kg)   12/13/16 231 lb (104.8 kg)   05/12/16 230 lb (104.3 kg)                  Labs reviewed in EPIC    Reviewed and updated as needed this visit by clinical staff  Tobacco  Allergies  Meds  Problems       Reviewed and updated as needed this visit by Provider  Allergies  Meds  Problems         ROS:  Constitutional, HEENT, cardiovascular, pulmonary, gi and gu systems are negative, except as otherwise noted.    OBJECTIVE:                                                    /78  Pulse 81  Temp 98.2  F (36.8  C) (Oral)  Ht 5' 4.5\" (1.638 m)  Wt 231 lb (104.8 kg)  BMI 39.04 kg/m2  Body mass index is 39.04 kg/(m^2).  GENERAL: healthy, alert and no distress  EYES: Eyes grossly normal to inspection, PERRL and conjunctivae and sclerae normal  NECK: no adenopathy, no asymmetry, masses, or scars and thyroid normal to palpation  RESP: lungs clear to auscultation - no rales, rhonchi or wheezes  CV: regular rate and rhythm, normal S1 S2, no S3 or S4, no murmur, click or rub, no peripheral edema and peripheral pulses strong  MS: no gross musculoskeletal defects noted, no edema, small 8 mm firm nodule in right palm near mcp of middle finger  SKIN: no suspicious lesions or rashes  PSYCH: mentation appears normal, affect normal/bright    Diagnostic Test Results:  none      ASSESSMENT/PLAN:                                                    (F33.0) Major depressive disorder, recurrent episode, mild (H)  (primary encounter diagnosis)  Comment: worsened due to stress of daughters illness   Plan: sertraline (ZOLOFT) 50 MG tablet        " Declines increase in dose, continue current dose   Refill x 6 months f/u 6 months for OV and labs for wellness exam    (I10) Hypertension goal BP (blood pressure) < 150/90  Comment: to goal  Plan: Basic metabolic panel,         losartan-hydrochlorothiazide (HYZAAR) 100-25 MG        per tablet        Labs today,  Refill x 6 months see above for f/u    (E78.5) Hyperlipidemia LDL goal <100  Comment: to goal  Plan: Lipid panel reflex to direct LDL, pravastatin         (PRAVACHOL) 10 MG tablet        Refill x 1 yr     (M79.641,  M79.642) Bilateral hand pain  Comment: worsening  Plan: RHEUMATOLOGY REFERRAL        Refer to rheum due to h/o MS r/o other autoimmune    (M79.651) Pain of right thigh  Comment: concerning for vascular compromise due to pattern of symptoms similar to claudication  Plan: VASCULAR REFERRAL        Refer to vascular surger      See Patient Instructions    Stephanie Olsen MD  [facility]

## 2017-07-10 ENCOUNTER — E-VISIT (OUTPATIENT)
Dept: FAMILY MEDICINE | Facility: CLINIC | Age: 72
End: 2017-07-10
Payer: MEDICARE

## 2017-07-10 DIAGNOSIS — R19.7 DIARRHEA, UNSPECIFIED TYPE: Primary | ICD-10-CM

## 2017-07-10 PROCEDURE — 99444 ZZC PHYSICIAN ONLINE EVALUATION & MANAGEMENT SERVICE: CPT | Performed by: FAMILY MEDICINE

## 2017-07-11 ENCOUNTER — OFFICE VISIT (OUTPATIENT)
Dept: VASCULAR SURGERY | Facility: CLINIC | Age: 72
End: 2017-07-11
Payer: MEDICARE

## 2017-07-11 ENCOUNTER — HOSPITAL ENCOUNTER (OUTPATIENT)
Dept: ULTRASOUND IMAGING | Facility: CLINIC | Age: 72
Discharge: HOME | End: 2017-07-11
Attending: SURGERY | Admitting: SURGERY
Payer: MEDICARE

## 2017-07-11 VITALS — DIASTOLIC BLOOD PRESSURE: 80 MMHG | RESPIRATION RATE: 16 BRPM | HEART RATE: 70 BPM | SYSTOLIC BLOOD PRESSURE: 167 MMHG

## 2017-07-11 DIAGNOSIS — G35 MS (MULTIPLE SCLEROSIS) (H): ICD-10-CM

## 2017-07-11 DIAGNOSIS — I10 HYPERTENSION GOAL BP (BLOOD PRESSURE) < 150/90: ICD-10-CM

## 2017-07-11 DIAGNOSIS — M79.605 LEG PAIN, BILATERAL: Primary | ICD-10-CM

## 2017-07-11 DIAGNOSIS — I73.9 PAD (PERIPHERAL ARTERY DISEASE) (H): ICD-10-CM

## 2017-07-11 DIAGNOSIS — M79.604 LEG PAIN, BILATERAL: Primary | ICD-10-CM

## 2017-07-11 DIAGNOSIS — E78.5 HYPERLIPIDEMIA LDL GOAL <130: ICD-10-CM

## 2017-07-11 PROCEDURE — 99203 OFFICE O/P NEW LOW 30 MIN: CPT | Performed by: PHYSICIAN ASSISTANT

## 2017-07-11 PROCEDURE — 93924 LWR XTR VASC STDY BILAT: CPT

## 2017-07-11 NOTE — MR AVS SNAPSHOT
After Visit Summary   7/11/2017    Stephanie Kathleen    MRN: [id number]           Patient Information     Date Of Birth          1945        Visit Information        Provider Department      7/11/2017 11:30 AM Virgil Brewer PA-C [facility]        Today's Diagnoses     Leg pain, bilateral    -  1    MS (multiple sclerosis) (H)        Hyperlipidemia LDL goal <130        Hypertension goal BP (blood pressure) < 150/90           Follow-ups after your visit        Your next 10 appointments already scheduled     Aug 24, 2017  9:00 AM CDT   [facility]    [address] [address] [phone]              Who to contact     If you have questions or need follow up information about today's clinic visit or your schedule please contact [facility] directly at [phone].  Normal or non-critical lab and imaging results will be communicated to you by MyChart, letter or phone within 4 business days after the clinic has received the results. If you do not hear from us within 7 days, please contact the clinic through Twitterhart or phone. If you have a critical or abnormal lab result, we will notify you by phone as soon as possible.  Submit refill requests through [employer] or call your pharmacy and they will forward the refill request to us. Please allow 3 business days for your refill to be completed.          Additional Information About Your Visit        MyChart Information     [employer] gives you secure access to your electronic health record. If you see a primary care provider, you can also send messages to your care team and make appointments. If you have questions, please call your primary care clinic.  If you do not have a primary care provider, please call [phone] and they will assist you.        Care EveryWhere ID     This is your Care EveryWhere ID. This could be used by other  organizations to access your [place] medical records  [id number]        Your Vitals Were     Pulse Respirations                70 16           Blood Pressure from Last 3 Encounters:   07/11/17 167/80   06/06/17 151/85   06/01/17 138/78    Weight from Last 3 Encounters:   06/01/17 231 lb (104.8 kg)   12/13/16 231 lb (104.8 kg)   05/12/16 230 lb (104.3 kg)              Today, you had the following     No orders found for display       Primary Care Provider Office Phone # Fax #    Stephanie Anabella Olsen [phone]       [facility] [address] [address] [address]        Equal Access to Services     LION MENDOZA : Hadii nain becko Socarly, waaxda luqadaha, qaybta kaalmada adeegyada, tegan velasco. So [facility] [phone].    ATENCIÓN: Si habla español, tiene a bowser disposición servicios gratuitos de asistencia lingüística. Llame al [phone].    We comply with applicable federal civil rights laws and [place] laws. We do not discriminate on the basis of race, color, national origin, age, disability sex, sexual orientation or gender identity.            Thank you!     Thank you for choosing [facility]  for your care. Our goal is always to provide you with excellent care. Hearing back from our patients is one way we can continue to improve our services. Please take a few minutes to complete the written survey that you may receive in the mail after your visit with us. Thank you!             Your Updated Medication List - Protect others around you: Learn how to safely use, store and throw away your medicines at www.disposemymeds.org.          This list is accurate as of: 7/11/17 11:59 PM.  Always use your most recent med list.                   Brand Name Dispense Instructions for use Diagnosis    losartan-hydrochlorothiazide 100-25 MG per tablet    HYZAAR    90 tablet    Take 1 tablet by mouth daily    Hypertension goal BP (blood pressure)  < 150/90       pravastatin 10 MG tablet    PRAVACHOL    30 tablet    Take 1 tablet every 3rd day    Hyperlipidemia LDL goal <100       sertraline 50 MG tablet    ZOLOFT    90 tablet    Take 1 tablet (50 mg) by mouth daily    Major depressive disorder, recurrent episode, mild (H)

## 2017-07-11 NOTE — PROGRESS NOTES
[facility]      Stephanie Kathleen MRN# [id number]   YOB: 1945 Age: 72 year old   Date of Visit: 7/11/2017           Assessment and Plan:   Bilateral thigh pain with activity - nonvascular etiology  Multiple sclerosis  Hyperlipidemia  Hypertension        I discussed the pathophysiology of peripheral arterial disease along with the classic signs and symptoms related to PAD.  Ms. Kathleen has normal ankle-brachial indices and palpable pedal pulses.  Her thigh pain is not caused by arterial insufficiency.  Ms. Kathleen was relieved to understand that her symptoms are not related to a vascular problem.  She feels certain they are secondary to her long-standing multiple sclerosis.  I encouraged her to continue awareness and follow with her primary care regarding her pain and further investigation by orthopedics or spine specialist may be warranted in the future if her symptoms continue to worsen without and identified cause.  She does not need further follow-up with vascular surgery and had no further questions.         Chief Complaint:   Right thigh pain, concerned about blood vessels    History is obtained from the patient         History of Present Illness:   This patient is a 72 year old female who presents with at least a six month history of right thigh pain and to a lesser extent left thigh pain.  She has a history of multiple sclerosis and was diagnosed more than 30 years ago.  She is concerned that her symptoms are due to arterial disease but is suspicious that they're related to her multiple sclerosis.  Ms. Kathleen is able to walk one to 1/2 miles before her thigh pain becomes overwhelming.  She is able to golf a short course.  She has four flights of stairs in her home which she is able to tolerate okay.  She will often take two Advil liquid gels daily for the thigh pain as well as arthritis in her hands.  She has had bilateral knee replacements.  She denies any calf pain  or buttock claudication.  She has not had swelling or edema in her legs.  She has not had any ischemic ulcers of her feet or toes.  She has not noted any varicose veins however her parents and siblings have all had procedures for varicose veins and venous insufficiency.  Her blood pressure and hyper lipidemia are both controlled with antihypertensives and statin prescribed by her primary provider.              Past Medical History:     Past Medical History:   Diagnosis Date     Arthritis      Basal cell carcinoma      COPD (chronic obstructive pulmonary disease) (H)      Depression      Depressive disorder     MS related     HTN      Hyperlipidemia      Lung cancer (H)      MS (multiple sclerosis) (H)      Squamous cell carcinoma      TIA (transient ischaemic attack)            Past Surgical History:     Past Surgical History:   Procedure Laterality Date     ABDOMEN SURGERY  1973,1975    C-sections     BIOPSY       C APPENDECTOMY       C TOTAL KNEE ARTHROPLASTY  01/2007    left knee     C TOTAL KNEE ARTHROPLASTY  1/10    right knee     C/SECTION, LOW TRANSVERSE      x 2     CARPAL TUNNEL RELEASE RT/LT      bilateral     CHOLECYSTECTOMY  2010     COLONOSCOPY  7/1/2013    Procedure: COLONOSCOPY;  colonoscopy       LOBECTOMY  02/2008    lower lobe               Social History:     Social History   Substance Use Topics     Smoking status: Former Smoker     Packs/day: 1.00     Years: 15.00     Types: Cigarettes     Quit date: 5/12/1999     Smokeless tobacco: Never Used      Comment: quit in 1999     Alcohol use No             Family History:   Ms. Kathleen has a family history of hypertension, cardiovascular disease and cancer.          Allergies:     Allergies   Allergen Reactions     Iodine      Itchy hives     Liquid Adhesive      blisters     Zocor [Hmg-Coa-R Inhibitors] Other (See Comments)     Not effective; muscle cramps             Medications:     Current Outpatient Prescriptions   Medication      losartan-hydrochlorothiazide (HYZAAR) 100-25 MG per tablet     pravastatin (PRAVACHOL) 10 MG tablet     sertraline (ZOLOFT) 50 MG tablet     [DISCONTINUED] FLUoxetine (PROZAC) 40 MG capsule     No current facility-administered medications for this visit.                Review of Systems:   The 10 point Review of Systems is negative other than noted in the HPI.            Physical Exam:   Blood pressure 167/80, pulse 70, resp. rate 16.  General - This is a well developed, well nourished female in no apparent distress.  HEENT - Normocephalic. Atraumatic. Moist mucous membranes. Pupils equal.  No scleral icterus.  Neck - Supple without masses.  No carotid bruits appreciated  Lungs - Clear to ascultation bilaterally without crackles or wheezing.    Heart - Regular rate & rhythm without murmur.  Abdomen - Soft, nontender, nondistended with +bowel sounds, no organomegaly.  Extremities - 2+ femoral pulses bilaterally 2+ right posterior tibialis pulse 2+ left dorsalis pedis pulse 1+ pedal edema. Normal color and temperature 2+ radial pulses bilaterally.  Moves all extremities.  Neurologic - CN II through XII grossly intact  strength, lower extremity strength equal bilaterally         Data:   All imaging studies reviewed by me.  ULTRASOUND MARY ELLEN DOPPLER WITH EXERCISE   7/11/2017 11:30 AM      HISTORY: Right thigh pain, evaluate for PAD. Peripheral vascular  disease, unspecified.     COMPARISON: None     FINDINGS: At rest, the ankle-brachial index is 1.14 on the right and  1.13 on the left. Pedal waveforms are biphasic bilaterally at rest.  Patient then walked on a treadmill at 1 miles per hour and 12% incline  for 4 minutes. Patient experienced left medial thigh pain after 1  minute and right thigh pain after 2 minutes. Following exercise, the  ankle-brachial index is 1.22 on the right and 1.18 on the left.         IMPRESSION: Normal ABIs.      Virgil Brewer PA-C  [facility]  [phone]

## 2017-07-11 NOTE — NURSING NOTE
"Chief Complaint   Patient presents with     Results     justin       Initial /80 (BP Location: Right arm, Patient Position: Chair, Cuff Size: Adult Regular)  Pulse 70  Resp 16 Estimated body mass index is 39.04 kg/(m^2) as calculated from the following:    Height as of 6/1/17: 5' 4.5\" (1.638 m).    Weight as of 6/1/17: 231 lb (104.8 kg).  Medication Reconciliation: complete.  richard martin LPN      "

## 2018-01-09 DIAGNOSIS — F33.0 MAJOR DEPRESSIVE DISORDER, RECURRENT EPISODE, MILD (H): ICD-10-CM

## 2018-01-09 DIAGNOSIS — I10 HYPERTENSION GOAL BP (BLOOD PRESSURE) < 150/90: ICD-10-CM

## 2018-01-09 NOTE — TELEPHONE ENCOUNTER
Pending Prescriptions:                       Disp   Refills    losartan-hydrochlorothiazide (HYZAAR) 100*90 tab*1            Sig: Take 1 tablet by mouth daily    sertraline (ZOLOFT) 50 MG tablet          90 tab*1            Sig: Take 1 tablet (50 mg) by mouth daily      Bridgett Nguyen MA

## 2018-01-09 NOTE — LETTER
January 12, 2018    Stephanie Kathleen  [address] [address]        Dear Stephanie,       We recently received a refill request for losartan-hydrochlorothiazide (HYZAAR) 100-25 MG per tablet and sertraline (ZOLOFT) 50 MG tablet.  We have refilled these for a one time 30 day supply only because you are due for a:    Hypertension & Depression office visit and fasting lab appointment      Please schedule this lab appointment 4-5 days prior to the office visit.     Please call at your earliest convenience so that there will not be a delay with your future refills.          Thank you,   Your [facility] Team/klf  [phone]

## 2018-01-10 RX ORDER — LOSARTAN POTASSIUM AND HYDROCHLOROTHIAZIDE 25; 100 MG/1; MG/1
1 TABLET ORAL DAILY
Qty: 30 TABLET | Refills: 0 | Status: SHIPPED | OUTPATIENT
Start: 2018-01-10

## 2018-01-10 NOTE — TELEPHONE ENCOUNTER
Medication is being filled for 1 time refill only due to:  Patient needs to be seen for fasting lab appointment and appointment with the provider for further refills.  Gloria Gomez RN

## 2018-04-24 ENCOUNTER — IMPORTED ENCOUNTER (OUTPATIENT)
Dept: URBAN - METROPOLITAN AREA CLINIC 31 | Facility: CLINIC | Age: 73
End: 2018-04-24

## 2018-04-24 PROBLEM — H25.813: Noted: 2018-04-24

## 2018-04-24 PROBLEM — H10.403: Noted: 2018-04-24

## 2018-04-24 PROBLEM — H02.423: Noted: 2018-04-24

## 2018-04-24 PROCEDURE — 92014 COMPRE OPH EXAM EST PT 1/>: CPT

## 2018-04-24 NOTE — PATIENT DISCUSSION
1.  Allergic Conjunctivitis OU -- Zadiot not working too well. Sample of Paseo given to try. 2.  Combined Types of Cataract OU: Explained how cataracts can effect vision. Recommend clinical observation. The patient was advised to contact us if any change or worsening of vision. 3. Myogenic Ptosis OU --  The patient has droopy upper eyelids bilaterally. Consult Dr. Carissa Parada. 4. Still comfortable with distance uncorrected. 5. Return for an appointment in 1 year for comprehensive exam. with Dr. Cinthya Grace.

## 2018-04-24 NOTE — PATIENT DISCUSSION
Myogenic Ptosis OU --  The patient has droopy upper eyelids bilaterally which intefers with vision. Surgical correction of the ptosis was recommended.

## 2018-05-14 ENCOUNTER — OFFICE VISIT (OUTPATIENT)
Dept: DERMATOLOGY | Facility: CLINIC | Age: 73
End: 2018-05-14
Payer: MEDICARE

## 2018-05-14 VITALS — HEART RATE: 81 BPM | OXYGEN SATURATION: 98 % | DIASTOLIC BLOOD PRESSURE: 85 MMHG | SYSTOLIC BLOOD PRESSURE: 148 MMHG

## 2018-05-14 DIAGNOSIS — C44.01 BASAL CELL CARCINOMA, LIP: Primary | ICD-10-CM

## 2018-05-14 DIAGNOSIS — L82.1 SK (SEBORRHEIC KERATOSIS): ICD-10-CM

## 2018-05-14 DIAGNOSIS — L30.9 DERMATITIS: ICD-10-CM

## 2018-05-14 DIAGNOSIS — L81.4 LENTIGO: ICD-10-CM

## 2018-05-14 DIAGNOSIS — Z85.828 HISTORY OF SKIN CANCER: ICD-10-CM

## 2018-05-14 DIAGNOSIS — C44.319 BASAL CELL CARCINOMA, FOREHEAD: ICD-10-CM

## 2018-05-14 PROCEDURE — 99213 OFFICE O/P EST LOW 20 MIN: CPT | Performed by: DERMATOLOGY

## 2018-05-14 RX ORDER — FLUOCINONIDE TOPICAL SOLUTION USP, 0.05% 0.5 MG/ML
SOLUTION TOPICAL
Qty: 120 ML | Refills: 3 | Status: SHIPPED | OUTPATIENT
Start: 2018-05-14

## 2018-05-14 RX ORDER — FLUOCINONIDE 0.5 MG/G
CREAM TOPICAL
Qty: 120 G | Refills: 3 | Status: SHIPPED | OUTPATIENT
Start: 2018-05-14

## 2018-05-14 NOTE — MR AVS SNAPSHOT
After Visit Summary   5/14/2018    Stephanie Kathleen    MRN: [id number]           Patient Information     Date Of Birth          1945        Visit Information        Provider Department      5/14/2018 11:15 AM Carlos Hickman MD [facility]        Today's Diagnoses     Basal cell carcinoma, lip    -  1    Basal cell carcinoma, forehead        Lentigo        SK (seborrheic keratosis)        History of skin cancer        Dermatitis           Follow-ups after your visit        Your next 10 appointments already scheduled     Jun 04, 2018  7:45 AM CDT   [facility] with Carlos Hickman MD   [facility]    [address] [address]   [phone]            Jun 11, 2018  7:45 AM CDT   MOHS with Carlos Hickman MD   [facility]    [address] [address]   [phone]            Jun 25, 2018  1:00 PM CDT   Return Visit with Carlos Hickman MD   [facility] ([facility]    [address] [address]   [phone]              Who to contact     If you have questions or need follow up information about today's clinic visit or your schedule please contact [facility] directly at [phone].  Normal or non-critical lab and imaging results will be communicated to you by MyChart, letter or phone within 4 business days after the clinic has received the results. If you do not hear from us within 7 days, please contact the clinic through MyChart or phone. If you have a critical or abnormal lab result, we will notify you by phone as soon as possible.  Submit refill requests through [employer] or call your pharmacy and they will forward the refill request to us. Please allow 3 business days for your refill to be completed.          Additional Information About Your Visit        MyChart Information      [employer] gives you secure access to your electronic health record. If you see a primary care provider, you can also send messages to your care team and make appointments. If you have questions, please call your primary care clinic.  If you do not have a primary care provider, please call [phone] and they will assist you.        Care EveryWhere ID     This is your Care EveryWhere ID. This could be used by other organizations to access your [place] medical records  [id number]        Your Vitals Were     Pulse Pulse Oximetry                81 98%           Blood Pressure from Last 3 Encounters:   05/14/18 148/85   07/11/17 167/80   06/06/17 151/85    Weight from Last 3 Encounters:   06/01/17 104.8 kg (231 lb)   12/13/16 104.8 kg (231 lb)   05/12/16 104.3 kg (230 lb)              Today, you had the following     No orders found for display         Today's Medication Changes          These changes are accurate as of 5/14/18 12:40 PM.  If you have any questions, ask your nurse or doctor.               Start taking these medicines.        Dose/Directions    * fluocinonide 0.05 % cream   Commonly known as:  LIDEX   Used for:  Basal cell carcinoma, lip, Basal cell carcinoma, forehead, Lentigo, SK (seborrheic keratosis), History of skin cancer, Dermatitis   Started by:  Carlos Hickman MD        Apply sparingly to affected area twice daily as needed.  Do not apply to face.   Quantity:  120 g   Refills:  3       * fluocinonide 0.05 % solution   Commonly known as:  LIDEX   Used for:  Basal cell carcinoma, lip, Basal cell carcinoma, forehead, Lentigo, SK (seborrheic keratosis), History of skin cancer, Dermatitis   Started by:  Carlos Hickman MD        Apply sparingly to affected area twice daily as needed on scalp .  Do not apply to face.   Quantity:  120 mL   Refills:  3       * Notice:  This list has 2 medication(s) that are the same as other medications prescribed for you. Read the directions  carefully, and ask your doctor or other care provider to review them with you.         Where to get your medicines      These medications were sent to [id number] - [place], [place] - [address]  [address] [address]     Phone:  [phone]     fluocinonide 0.05 % cream    fluocinonide 0.05 % solution                Primary Care Provider Office Phone # Fax #    Stephanie Olsen [phone] [address] [facility] [address]        Equal Access to Services     SANNA MENDOZA : Hadii aad ku hadasho Soomaali, waaxda luqadaha, qaybta kaalmada adeegyada, tegan thompson . So [facility] [phone].    ATENCIÓN: Si habla español, tiene a bowser disposición servicios gratuitos de asistencia lingüística. [facility] [phone].    We comply with applicable federal civil rights laws and [place] laws. We do not discriminate on the basis of race, color, national origin, age, disability, sex, sexual orientation, or gender identity.            Thank you!     Thank you for choosing [facility]  for your care. Our goal is always to provide you with excellent care. Hearing back from our patients is one way we can continue to improve our services. Please take a few minutes to complete the written survey that you may receive in the mail after your visit with us. Thank you!             Your Updated Medication List - Protect others around you: Learn how to safely use, store and throw away your medicines at www.disposemymeds.org.          This list is accurate as of 5/14/18 12:40 PM.  Always use your most recent med list.                   Brand Name Dispense Instructions for use Diagnosis    * fluocinonide 0.05 % cream    LIDEX    120 g    Apply sparingly to affected area twice daily as needed.  Do not apply to face.    Basal cell carcinoma, lip, Basal cell carcinoma, forehead, Lentigo, SK (seborrheic  keratosis), History of skin cancer, Dermatitis       * fluocinonide 0.05 % solution    LIDEX    120 mL    Apply sparingly to affected area twice daily as needed on scalp .  Do not apply to face.    Basal cell carcinoma, lip, Basal cell carcinoma, forehead, Lentigo, SK (seborrheic keratosis), History of skin cancer, Dermatitis       losartan-hydrochlorothiazide 100-25 MG per tablet    HYZAAR    30 tablet    Take 1 tablet by mouth daily    Hypertension goal BP (blood pressure) < 150/90       pravastatin 10 MG tablet    PRAVACHOL    30 tablet    Take 1 tablet every 3rd day    Hyperlipidemia LDL goal <100       sertraline 50 MG tablet    ZOLOFT    30 tablet    Take 1 tablet (50 mg) by mouth daily    Major depressive disorder, recurrent episode, mild (H)       * Notice:  This list has 2 medication(s) that are the same as other medications prescribed for you. Read the directions carefully, and ask your doctor or other care provider to review them with you.

## 2018-05-14 NOTE — LETTER
5/14/2018         RE: Stephanie Kathleen  [address]  [place] [facility] [address]        Dear Colleague,    Thank you for referring your patient, Stephanie Kathleen, to the [facility]. Please see a copy of my visit note below.    Stephanie Kathleen is a 73 year old year old female patient here today for itching on scalp nad hands.  She also has a hx of basal cell carcinoma on forehead and liip bx in 6/2017 both basal cell carcinoma, have consinuted to grow an dshe would like treated.   .  Patient states this has been present for a while onhands and scalp.  Patient reports the following symptoms:  itching.  Patient reports the following previous treatments none.  Patient reports the following modifying factors none.  Associated symptoms: none.  Patient has no other skin complaints today.  Remainder of the HPI, Meds, PMH, Allergies, FH, and SH was reviewed in chart.    Pertinent Hx:   Non-melanoma skin cancer   Past Medical History:   Diagnosis Date     Arthritis      Basal cell carcinoma      COPD (chronic obstructive pulmonary disease) (H)      Depression      Depressive disorder     MS related     HTN      Hyperlipidemia      Lung cancer (H)      MS (multiple sclerosis) (H)      Squamous cell carcinoma      TIA (transient ischaemic attack)        Past Surgical History:   Procedure Laterality Date     ABDOMEN SURGERY  1973,1975    C-sections     BIOPSY       C APPENDECTOMY       C TOTAL KNEE ARTHROPLASTY  01/2007    left knee     C TOTAL KNEE ARTHROPLASTY  1/10    right knee     C/SECTION, LOW TRANSVERSE      x 2     CARPAL TUNNEL RELEASE RT/LT      bilateral     CHOLECYSTECTOMY  2010     COLONOSCOPY  7/1/2013    Procedure: COLONOSCOPY;  colonoscopy       LOBECTOMY  02/2008    lower lobe        Family History   Problem Relation Age of Onset     CANCER Mother      ovarian     Hypertension Father      CANCER Father      Cardiovascular Father      HEART DISEASE Father      Depression Father      Arthritis  Sister      CANCER Sister      ovarian and breast     Obesity Sister      Hypertension Sister      Alcohol/Drug Daughter      Depression Daughter      Arthritis Sister      CANCER Sister      Alcohol/Drug Daughter      Depression Daughter      CANCER Paternal Grandfather      lung     Obesity Paternal Grandfather      Obesity Paternal Grandmother      Breast Cancer Sister      Melanoma No family hx of        Social History     Social History     Marital status:      Spouse name: N/A     Number of children: N/A     Years of education: N/A     Occupational History      Retired     Social History Main Topics     Smoking status: Former Smoker     Packs/day: 1.00     Years: 15.00     Types: Cigarettes     Quit date: 5/12/1999     Smokeless tobacco: Never Used      Comment: quit in 1999     Alcohol use No     Drug use: No     Sexual activity: No     Other Topics Concern     Parent/Sibling W/ Cabg, Mi Or Angioplasty Before 65f 55m? No     Social History Narrative       Outpatient Encounter Prescriptions as of 5/14/2018   Medication Sig Dispense Refill     pravastatin (PRAVACHOL) 10 MG tablet Take 1 tablet every 3rd day 30 tablet 3     losartan-hydrochlorothiazide (HYZAAR) 100-25 MG per tablet Take 1 tablet by mouth daily 30 tablet 0     sertraline (ZOLOFT) 50 MG tablet Take 1 tablet (50 mg) by mouth daily 30 tablet 0     No facility-administered encounter medications on file as of 5/14/2018.              Review Of Systems  Skin: As above  Eyes: negative  Ears/Nose/Throat: negative  Respiratory: No shortness of breath, dyspnea on exertion, cough, or hemoptysis  Cardiovascular: negative  Gastrointestinal: negative  Genitourinary: negative  Musculoskeletal: negative  Neurologic: negative  Psychiatric: negative  Hematologic/Lymphatic/Immunologic: negative  Endocrine: negative      O:   NAD, WDWN, Alert & Oriented, Mood & Affect wnl, Vitals stable   Here today alone   /85  Pulse 81  SpO2 98%   General appearance  normal   Vitals stable   Alert, oriented and in no acute distress      Following lymph nodes palpated: Occipital, Cervical, Supraclavicular no lad   Stuck on papules and brown macules on trunk and ext    Red scaly plaques on hands and scalp    L lip at vermillion 6mm scaly papule    R forehead 1cm red scaly papule           The remainder of the full exam was unremarkable; the following areas were examined:  conjunctiva/lids, oral mucosa, neck, peripheral vascular system, abdomen, lymph nodes, digits/nails, eccrine and apocrine glands, scalp/hair, face, neck, chest, abdomen, buttocks, back, RUE, LUE, RLE, LLE       Eyes: Conjunctivae/lids:Normal     ENT: Lips, buccal mucosa, tongue: normal    MSK:Normal    Cardiovascular: peripheral edema none    Pulm: Breathing Normal    Lymph Nodes: No Head and Neck Lymphadenopathy     Neuro/Psych: Orientation:Normal; Mood/Affect:Normal      A/P:  1. Hx of basal cell carcinoma, seborrheic keratosis, lentigo  2. Basal cell carcinoma lip and forehead will schedule  3. ?psoriasis  Lidex for hands and scalp  Return to clinic 6 weeks  BENIGN LESIONS DISCUSSED WITH PATIENT:  I discussed the specifics of tumor, prognosis, and genetics of benign lesions.  I explained that treatment of these lesions would be purely cosmetic and not medically neccessary.  I discussed with patient different removal options including excision, cautery and /or laser.      Nature and genetics of benign skin lesions dicussed with patient.  Signs and Symptoms of skin cancer discussed with patient.  Patient to follow up with Primary Care provider regarding elevated blood pressure.  Patient encouraged to perform monthly skin exams.  UV precautions reviewed with patient.  Patient to follow up with Primary Care provider regarding elevated blood pressure.  Skin care regimen reviewed with patient: Eliminate harsh soaps, i.e. Dial, zest, irsih spring; Mild soaps such as Cetaphil or Dove sensitive skin, avoid hot or cold  showers, aggressive use of emollients including vanicream, cetaphil or cerave discussed with patient.    Risks of non-melanoma skin cancer discussed with patient   Return to clinic 6 weeks      Again, thank you for allowing me to participate in the care of your patient.        Sincerely,        Carlos Hickman MD

## 2018-05-14 NOTE — PROGRESS NOTES
Stephanie Kathleen is a 73 year old year old female patient here today for itching on scalp nad hands.  She also has a hx of basal cell carcinoma on forehead and liip bx in 6/2017 both basal cell carcinoma, have consinuted to grow an dshe would like treated.   .  Patient states this has been present for a while onhands and scalp.  Patient reports the following symptoms:  itching.  Patient reports the following previous treatments none.  Patient reports the following modifying factors none.  Associated symptoms: none.  Patient has no other skin complaints today.  Remainder of the HPI, Meds, PMH, Allergies, FH, and SH was reviewed in chart.    Pertinent Hx:   Non-melanoma skin cancer   Past Medical History:   Diagnosis Date     Arthritis      Basal cell carcinoma      COPD (chronic obstructive pulmonary disease) (H)      Depression      Depressive disorder     MS related     HTN      Hyperlipidemia      Lung cancer (H)      MS (multiple sclerosis) (H)      Squamous cell carcinoma      TIA (transient ischaemic attack)        Past Surgical History:   Procedure Laterality Date     ABDOMEN SURGERY  1973,1975    C-sections     BIOPSY       C APPENDECTOMY       C TOTAL KNEE ARTHROPLASTY  01/2007    left knee     C TOTAL KNEE ARTHROPLASTY  1/10    right knee     C/SECTION, LOW TRANSVERSE      x 2     CARPAL TUNNEL RELEASE RT/LT      bilateral     CHOLECYSTECTOMY  2010     COLONOSCOPY  7/1/2013    Procedure: COLONOSCOPY;  colonoscopy       LOBECTOMY  02/2008    lower lobe        Family History   Problem Relation Age of Onset     CANCER Mother      ovarian     Hypertension Father      CANCER Father      Cardiovascular Father      HEART DISEASE Father      Depression Father      Arthritis Sister      CANCER Sister      ovarian and breast     Obesity Sister      Hypertension Sister      Alcohol/Drug Daughter      Depression Daughter      Arthritis Sister      CANCER Sister      Alcohol/Drug Daughter      Depression Daughter       CANCER Paternal Grandfather      lung     Obesity Paternal Grandfather      Obesity Paternal Grandmother      Breast Cancer Sister      Melanoma No family hx of        Social History     Social History     Marital status:      Spouse name: N/A     Number of children: N/A     Years of education: N/A     Occupational History      Retired     Social History Main Topics     Smoking status: Former Smoker     Packs/day: 1.00     Years: 15.00     Types: Cigarettes     Quit date: 5/12/1999     Smokeless tobacco: Never Used      Comment: quit in 1999     Alcohol use No     Drug use: No     Sexual activity: No     Other Topics Concern     Parent/Sibling W/ Cabg, Mi Or Angioplasty Before 65f 55m? No     Social History Narrative       Outpatient Encounter Prescriptions as of 5/14/2018   Medication Sig Dispense Refill     pravastatin (PRAVACHOL) 10 MG tablet Take 1 tablet every 3rd day 30 tablet 3     losartan-hydrochlorothiazide (HYZAAR) 100-25 MG per tablet Take 1 tablet by mouth daily 30 tablet 0     sertraline (ZOLOFT) 50 MG tablet Take 1 tablet (50 mg) by mouth daily 30 tablet 0     No facility-administered encounter medications on file as of 5/14/2018.              Review Of Systems  Skin: As above  Eyes: negative  Ears/Nose/Throat: negative  Respiratory: No shortness of breath, dyspnea on exertion, cough, or hemoptysis  Cardiovascular: negative  Gastrointestinal: negative  Genitourinary: negative  Musculoskeletal: negative  Neurologic: negative  Psychiatric: negative  Hematologic/Lymphatic/Immunologic: negative  Endocrine: negative      O:   NAD, WDWN, Alert & Oriented, Mood & Affect wnl, Vitals stable   Here today alone   /85  Pulse 81  SpO2 98%   General appearance normal   Vitals stable   Alert, oriented and in no acute distress      Following lymph nodes palpated: Occipital, Cervical, Supraclavicular no lad   Stuck on papules and brown macules on trunk and ext    Red scaly plaques on hands and scalp     L lip at vermillion 6mm scaly papule    R forehead 1cm red scaly papule           The remainder of the full exam was unremarkable; the following areas were examined:  conjunctiva/lids, oral mucosa, neck, peripheral vascular system, abdomen, lymph nodes, digits/nails, eccrine and apocrine glands, scalp/hair, face, neck, chest, abdomen, buttocks, back, RUE, LUE, RLE, LLE       Eyes: Conjunctivae/lids:Normal     ENT: Lips, buccal mucosa, tongue: normal    MSK:Normal    Cardiovascular: peripheral edema none    Pulm: Breathing Normal    Lymph Nodes: No Head and Neck Lymphadenopathy     Neuro/Psych: Orientation:Normal; Mood/Affect:Normal      A/P:  1. Hx of basal cell carcinoma, seborrheic keratosis, lentigo  2. Basal cell carcinoma lip and forehead will schedule  3. ?psoriasis  Lidex for hands and scalp  Return to clinic 6 weeks  BENIGN LESIONS DISCUSSED WITH PATIENT:  I discussed the specifics of tumor, prognosis, and genetics of benign lesions.  I explained that treatment of these lesions would be purely cosmetic and not medically neccessary.  I discussed with patient different removal options including excision, cautery and /or laser.      Nature and genetics of benign skin lesions dicussed with patient.  Signs and Symptoms of skin cancer discussed with patient.  Patient to follow up with Primary Care provider regarding elevated blood pressure.  Patient encouraged to perform monthly skin exams.  UV precautions reviewed with patient.  Patient to follow up with Primary Care provider regarding elevated blood pressure.  Skin care regimen reviewed with patient: Eliminate harsh soaps, i.e. Dial, zest, irsih spring; Mild soaps such as Cetaphil or Dove sensitive skin, avoid hot or cold showers, aggressive use of emollients including vanicream, cetaphil or cerave discussed with patient.    Risks of non-melanoma skin cancer discussed with patient   Return to clinic 6 weeks

## 2018-06-04 ENCOUNTER — OFFICE VISIT (OUTPATIENT)
Dept: DERMATOLOGY | Facility: CLINIC | Age: 73
End: 2018-06-04
Payer: MEDICARE

## 2018-06-04 VITALS — OXYGEN SATURATION: 93 % | HEART RATE: 70 BPM | SYSTOLIC BLOOD PRESSURE: 125 MMHG | DIASTOLIC BLOOD PRESSURE: 71 MMHG

## 2018-06-04 DIAGNOSIS — C44.319 BASAL CELL CARCINOMA, FOREHEAD: Primary | ICD-10-CM

## 2018-06-04 PROCEDURE — 17311 MOHS 1 STAGE H/N/HF/G: CPT | Performed by: DERMATOLOGY

## 2018-06-04 PROCEDURE — 13132 CMPLX RPR F/C/C/M/N/AX/G/H/F: CPT | Mod: 51 | Performed by: DERMATOLOGY

## 2018-06-04 RX ORDER — OLOPATADINE HYDROCHLORIDE 7 MG/ML
SOLUTION OPHTHALMIC
Refills: 3 | COMMUNITY
Start: 2018-05-08

## 2018-06-04 NOTE — NURSING NOTE
Surgical Office Location :   [facility]  [address] [place] [address]

## 2018-06-04 NOTE — PATIENT INSTRUCTIONS
Sutured Wound Care     [facility]: [phone]    [facility]: [phone]    Right forehead      ? No strenuous activity for 48 hours. Resume moderate activity in 48 hours. No heavy exercising until you are seen for follow up in one week.     ? Take Tylenol as needed for discomfort.                         ? Do not drink alcoholic beverages for 48 hours.     ? Keep the pressure bandage in place for 24 hours. If the bandage becomes blood tinged or loose, reinforce it with gauze and tape.        (Refer to the reverse side of this page for management of bleeding).    ? Remove pressure bandage in 24 hours     ? Leave the flat bandage in place until your follow up appointment.    ? Keep the bandage dry. Wash around it carefully.    ? If the tape becomes soiled or starts to come off, reinforce it with additional paper tape.    ? Do not smoke for 3 weeks; smoking is detrimental to wound healing.    ? It is normal to have swelling and bruising around the surgical site. The bruising will fade in approximately 10-14 days. Elevate the area to reduce swelling.    ? Numbness, itchiness and sensitivity to temperature changes can occur after surgery and may take up to 18 months to normalize.      POSSIBLE COMPLICATIONS    BLEEDIN. Leave the bandage in place.  2. Use tightly rolled up gauze or a cloth to apply direct pressure over the bandage for 20   minutes.  3. Reapply pressure for an additional 20 minutes if necessary  4. Call the office or go to the nearest emergency room if pressure fails to stop the bleeding.  5. Use additional gauze and tape to maintain pressure once the bleeding has stopped.        PAIN:    1. Post operative pain should slowly get better, never worse.  2. A severe increase in pain may indicate a problem. Call the office if this occurs.    In case of emergency phone:Dr Hickman [phone]

## 2018-06-04 NOTE — NURSING NOTE
"Initial /71  Pulse 70  SpO2 93% Estimated body mass index is 39.04 kg/(m^2) as calculated from the following:    Height as of 6/1/17: 1.638 m (5' 4.5\").    Weight as of 6/1/17: 104.8 kg (231 lb). .    Delilah Montgomery LPN    "

## 2018-06-04 NOTE — PROGRESS NOTES
Stephanie Kathleen is a 73 year old year old female patient here today for evaluation and managment of basal cell carcinoma on forehead.  Patient reports the following previous treatments none.  Patient reports the following modifying factors none.  Associated symptoms: none.  Patient has no other skin complaints today.  Remainder of the HPI, Meds, PMH, Allergies, FH, and SH was reviewed in chart.      Past Medical History:   Diagnosis Date     Arthritis      Basal cell carcinoma      COPD (chronic obstructive pulmonary disease) (H)      Depression      Depressive disorder     MS related     HTN      Hyperlipidemia      Lung cancer (H)      MS (multiple sclerosis) (H)      Squamous cell carcinoma      TIA (transient ischaemic attack)        Past Surgical History:   Procedure Laterality Date     ABDOMEN SURGERY  1973,1975    C-sections     BIOPSY       C APPENDECTOMY       C TOTAL KNEE ARTHROPLASTY  01/2007    left knee     C TOTAL KNEE ARTHROPLASTY  1/10    right knee     C/SECTION, LOW TRANSVERSE      x 2     CARPAL TUNNEL RELEASE RT/LT      bilateral     CHOLECYSTECTOMY  2010     COLONOSCOPY  7/1/2013    Procedure: COLONOSCOPY;  colonoscopy       LOBECTOMY  02/2008    lower lobe        Family History   Problem Relation Age of Onset     CANCER Mother      ovarian     Hypertension Father      CANCER Father      Cardiovascular Father      HEART DISEASE Father      Depression Father      Arthritis Sister      CANCER Sister      ovarian and breast     Obesity Sister      Hypertension Sister      Alcohol/Drug Daughter      Depression Daughter      Arthritis Sister      CANCER Sister      Alcohol/Drug Daughter      Depression Daughter      CANCER Paternal Grandfather      lung     Obesity Paternal Grandfather      Obesity Paternal Grandmother      Breast Cancer Sister      Melanoma No family hx of        Social History     Social History     Marital status:      Spouse name: N/A     Number of children: N/A     Years of  education: N/A     Occupational History      Retired     Social History Main Topics     Smoking status: Former Smoker     Packs/day: 1.00     Years: 15.00     Types: Cigarettes     Quit date: 5/12/1999     Smokeless tobacco: Never Used      Comment: quit in 1999     Alcohol use No     Drug use: No     Sexual activity: No     Other Topics Concern     Parent/Sibling W/ Cabg, Mi Or Angioplasty Before 65f 55m? No     Social History Narrative       Outpatient Encounter Prescriptions as of 6/4/2018   Medication Sig Dispense Refill     Cholecalciferol 1000 UNT/0.03ML LIQD Take 5 drops every day by oral route.       fluocinonide (LIDEX) 0.05 % cream Apply sparingly to affected area twice daily as needed.  Do not apply to face. 120 g 3     fluocinonide (LIDEX) 0.05 % solution Apply sparingly to affected area twice daily as needed on scalp .  Do not apply to face. 120 mL 3     losartan-hydrochlorothiazide (HYZAAR) 100-25 MG per tablet Take 1 tablet by mouth daily 30 tablet 0     PAZEO 0.7 % SOLN PLACE 1 DROP IN BOTH EYES D  3     pravastatin (PRAVACHOL) 10 MG tablet Take 1 tablet every 3rd day 30 tablet 3     sertraline (ZOLOFT) 50 MG tablet Take 1 tablet (50 mg) by mouth daily 30 tablet 0     No facility-administered encounter medications on file as of 6/4/2018.              Review Of Systems  Skin: As above  Eyes: negative  Ears/Nose/Throat: negative  Respiratory: No shortness of breath, dyspnea on exertion, cough, or hemoptysis  Cardiovascular: negative  Gastrointestinal: negative  Genitourinary: negative  Musculoskeletal: negative  Neurologic: negative  Psychiatric: negative  Hematologic/Lymphatic/Immunologic: negative  Endocrine: negative      O:   NAD, WDWN, Alert & Oriented, Mood & Affect wnl, Vitals stable   Here today alone   /71  Pulse 70  SpO2 93%   General appearance normal   Vitals stable   Alert, oriented and in no acute distress     Forehead 1cm pink pearly papule       Eyes: Conjunctivae/lids:Normal      ENT: Lips, buccal mucosa, tongue: normal    MSK:Normal    Cardiovascular: peripheral edema none    Pulm: Breathing Normal    Neuro/Psych: Orientation:Normal; Mood/Affect:Normal      A/P:  1/ R forehead basal cell carcinoma  MOHS:   Location    After PGACAC discussed with patient, decision for Mohs surgery was made. Indication for Mohs was Location. Patient confirmed the site with Dr. Hickman.  After anesthesia with LEC, the tumor was excised using standard Mohs technique in 1 stages(s).  CLEAR MARGINS OBTAINED and Final defect size was 1.7 cm.       REPAIR COMPLEX: Because of the tightness of the surrounding skin and Because of the size and full thickness nature of the defect, a complex closure was planned. After LEC anesthesia and prep, Burow's triangles were excised in the relaxed skin tension lines. The wound edges were widely undermined by dissection in the subcutaneous plane until adequate tissue mobility was obtained. Hemostasis was obtained. The wound edges were closed in a layered fashion using Vicryl and Fast Absorbing Plain Gut sutures. Postoperative length was 4.2 cm.   EBL minimal; complications none; wound care routine.  The patient was discharged in good condition and will return in one week for wound evaluation.    BENIGN LESIONS DISCUSSED WITH PATIENT:  I discussed the specifics of tumor, prognosis, and genetics of benign lesions.  I explained that treatment of these lesions would be purely cosmetic and not medically neccessary.  I discussed with patient different removal options including excision, cautery and /or laser.      Nature and genetics of benign skin lesions dicussed with patient.  Signs and Symptoms of skin cancer discussed with patient.  Patient to follow up with Primary Care provider regarding elevated blood pressure.  Patient encouraged to perform monthly skin exams.  UV precautions reviewed with patient.  Patient to follow up with Primary Care provider regarding elevated blood  pressure.  Skin care regimen reviewed with patient: Eliminate harsh soaps, i.e. Dial, zest, irsih spring; Mild soaps such as Cetaphil or Dove sensitive skin, avoid hot or cold showers, aggressive use of emollients including vanicream, cetaphil or cerave discussed with patient.    Risks of non-melanoma skin cancer discussed with patient   Return to clinic 6 monthds

## 2018-06-04 NOTE — MR AVS SNAPSHOT
After Visit Summary   2018    Stephanie Kathleen    MRN: [id number]           Patient Information     Date Of Birth          1945        Visit Information        Provider Department      2018 7:45 AM Carlos Hickman MD [facility]        Care Instructions      Sutured Wound Care     [facility]: [phone]    [facility] [phone]    Right forehead      ? No strenuous activity for 48 hours. Resume moderate activity in 48 hours. No heavy exercising until you are seen for follow up in one week.     ? Take Tylenol as needed for discomfort.                         ? Do not drink alcoholic beverages for 48 hours.     ? Keep the pressure bandage in place for 24 hours. If the bandage becomes blood tinged or loose, reinforce it with gauze and tape.        (Refer to the reverse side of this page for management of bleeding).    ? Remove pressure bandage in 24 hours     ? Leave the flat bandage in place until your follow up appointment.    ? Keep the bandage dry. Wash around it carefully.    ? If the tape becomes soiled or starts to come off, reinforce it with additional paper tape.    ? Do not smoke for 3 weeks; smoking is detrimental to wound healing.    ? It is normal to have swelling and bruising around the surgical site. The bruising will fade in approximately 10-14 days. Elevate the area to reduce swelling.    ? Numbness, itchiness and sensitivity to temperature changes can occur after surgery and may take up to 18 months to normalize.      POSSIBLE COMPLICATIONS    BLEEDIN. Leave the bandage in place.  2. Use tightly rolled up gauze or a cloth to apply direct pressure over the bandage for 20   minutes.  3. Reapply pressure for an additional 20 minutes if necessary  4. Call the office or go to the nearest emergency room if pressure fails to stop the bleeding.  5. Use additional gauze and tape to maintain pressure once the bleeding has  stopped.        PAIN:    1. Post operative pain should slowly get better, never worse.  2. A severe increase in pain may indicate a problem. Call the office if this occurs.    In case of emergency phone:Dr Hickman [phone]              Follow-ups after your visit        Your next 10 appointments already scheduled     Jun 11, 2018  7:45 AM CDT   [facility]man MD   [facility]    [address] [address] [phone]            Jun 25, 2018  1:00 PM CDT   Return Visit with Carlos Hickman MD   [facility]    [address] [address] [phone]              Who to contact     If you have questions or need follow up information about today's clinic visit or your schedule please contact [facility] directly at [phone].  Normal or non-critical lab and imaging results will be communicated to you by Purkinjehart, letter or phone within 4 business days after the clinic has received the results. If you do not hear from us within 7 days, please contact the clinic through [employer] or phone. If you have a critical or abnormal lab result, we will notify you by phone as soon as possible.  Submit refill requests through [employer] or call your pharmacy and they will forward the refill request to us. Please allow 3 business days for your refill to be completed.          Additional Information About Your Visit        PurkinjeharRegeneMed Information     [employer] gives you secure access to your electronic health record. If you see a primary care provider, you can also send messages to your care team and make appointments. If you have questions, please call your primary care clinic.  If you do not have a primary care provider, please call [phone] and they will assist you.        Care EveryWhere ID     This is your Care EveryWhere ID. This could be used by other organizations  to access your [place] medical records  [id number]        Your Vitals Were     Pulse Pulse Oximetry                70 93%           Blood Pressure from Last 3 Encounters:   06/04/18 125/71   05/14/18 148/85   07/11/17 167/80    Weight from Last 3 Encounters:   06/01/17 104.8 kg (231 lb)   12/13/16 104.8 kg (231 lb)   05/12/16 104.3 kg (230 lb)              Today, you had the following     No orders found for display       Primary Care Provider Office Phone # Fax #    Stephanie Anabella Olsen [phone] [address] [address] [address]        Equal Access to Services     LION MENDOZA : Hadii nain becko Socarly, waaxda luqadaha, qaybta kaalmada adesabrayada, tegan velasco. So [facility] [phone].    ATENCIÓN: Si habla español, tiene a bowser disposición servicios gratuitos de asistencia lingüística. Llame al [phone].    We comply with applicable federal civil rights laws and [place] laws. We do not discriminate on the basis of race, color, national origin, age, disability, sex, sexual orientation, or gender identity.            Thank you!     Thank you for choosing [facility]  for your care. Our goal is always to provide you with excellent care. Hearing back from our patients is one way we can continue to improve our services. Please take a few minutes to complete the written survey that you may receive in the mail after your visit with us. Thank you!             Your Updated Medication List - Protect others around you: Learn how to safely use, store and throw away your medicines at www.disposemymeds.org.          This list is accurate as of 6/4/18 10:37 AM.  Always use your most recent med list.                   Brand Name Dispense Instructions for use Diagnosis    Cholecalciferol 1000 UNT/0.03ML Liqd      Take 5 drops every day by oral route.        * fluocinonide 0.05 % cream    LIDEX    120 g    Apply sparingly to affected area twice daily  as needed.  Do not apply to face.    Basal cell carcinoma, lip, Basal cell carcinoma, forehead, Lentigo, SK (seborrheic keratosis), History of skin cancer, Dermatitis       * fluocinonide 0.05 % solution    LIDEX    120 mL    Apply sparingly to affected area twice daily as needed on scalp .  Do not apply to face.    Basal cell carcinoma, lip, Basal cell carcinoma, forehead, Lentigo, SK (seborrheic keratosis), History of skin cancer, Dermatitis       losartan-hydrochlorothiazide 100-25 MG per tablet    HYZAAR    30 tablet    Take 1 tablet by mouth daily    Hypertension goal BP (blood pressure) < 150/90       PAZEO 0.7 % Soln   Generic drug:  Olopatadine HCl      PLACE 1 DROP IN BOTH EYES D        pravastatin 10 MG tablet    PRAVACHOL    30 tablet    Take 1 tablet every 3rd day    Hyperlipidemia LDL goal <100       sertraline 50 MG tablet    ZOLOFT    30 tablet    Take 1 tablet (50 mg) by mouth daily    Major depressive disorder, recurrent episode, mild (H)       * Notice:  This list has 2 medication(s) that are the same as other medications prescribed for you. Read the directions carefully, and ask your doctor or other care provider to review them with you.

## 2018-06-11 ENCOUNTER — OFFICE VISIT (OUTPATIENT)
Dept: DERMATOLOGY | Facility: CLINIC | Age: 73
End: 2018-06-11
Payer: MEDICARE

## 2018-06-11 ENCOUNTER — ALLIED HEALTH/NURSE VISIT (OUTPATIENT)
Dept: DERMATOLOGY | Facility: CLINIC | Age: 73
End: 2018-06-11
Payer: MEDICARE

## 2018-06-11 VITALS — SYSTOLIC BLOOD PRESSURE: 136 MMHG | HEART RATE: 71 BPM | DIASTOLIC BLOOD PRESSURE: 78 MMHG | TEMPERATURE: 97.7 F

## 2018-06-11 DIAGNOSIS — Z48.02 ENCOUNTER FOR REMOVAL OF SUTURES: Primary | ICD-10-CM

## 2018-06-11 DIAGNOSIS — L29.9 SCALP ITCH: ICD-10-CM

## 2018-06-11 DIAGNOSIS — C44.01 BASAL CELL CARCINOMA, LIP: Primary | ICD-10-CM

## 2018-06-11 PROCEDURE — 99213 OFFICE O/P EST LOW 20 MIN: CPT | Mod: 24 | Performed by: DERMATOLOGY

## 2018-06-11 PROCEDURE — 17311 MOHS 1 STAGE H/N/HF/G: CPT | Mod: 79 | Performed by: DERMATOLOGY

## 2018-06-11 PROCEDURE — 99207 ZZC NO CHARGE NURSE ONLY: CPT

## 2018-06-11 NOTE — MR AVS SNAPSHOT
After Visit Summary   6/11/2018    Stephanie Kathleen    MRN: [id number]           Patient Information     Date Of Birth          1945        Visit Information        Provider Department      6/11/2018 1:15 PM [facility]        Today's Diagnoses     Encounter for removal of sutures    -  1       Follow-ups after your visit        Your next 10 appointments already scheduled     Sep 10, 2018  9:45 AM CDT   [facility] Visit with Carlos Hickman MD   [facility]    [address] [address] [phone]              Who to contact     If you have questions or need follow up information about today's clinic visit or your schedule please contact [facility] directly at [phone].  Normal or non-critical lab and imaging results will be communicated to you by MyChart, letter or phone within 4 business days after the clinic has received the results. If you do not hear from us within 7 days, please contact the clinic through MyChart or phone. If you have a critical or abnormal lab result, we will notify you by phone as soon as possible.  Submit refill requests through [employer] or call your pharmacy and they will forward the refill request to us. Please allow 3 business days for your refill to be completed.          Additional Information About Your Visit        MyChart Information     [employer] gives you secure access to your electronic health record. If you see a primary care provider, you can also send messages to your care team and make appointments. If you have questions, please call your primary care clinic.  If you do not have a primary care provider, please call [phone] and they will assist you.        Care EveryWhere ID     This is your Care EveryWhere ID. This could be used by other organizations to access your [place] medical records  [id number]         Blood Pressure from Last 3  Encounters:   06/11/18 136/78   06/04/18 125/71   05/14/18 148/85    Weight from Last 3 Encounters:   06/01/17 104.8 kg (231 lb)   12/13/16 104.8 kg (231 lb)   05/12/16 104.3 kg (230 lb)              Today, you had the following     No orders found for display       Primary Care Provider Office Phone # Fax #    Stephanie Anabella Olsen [phone] [address] [address] [address]        Equal Access to Services     [facility]: Hadii aad ku hadasho Soomaali, waaxda luqadaha, qaybta kaalmada adeegyada, waxmarc thompson . So [facility] [phone].    ATENCIÓN: Si habla español, tiene a bowser disposición servicios gratuitos de asistencia lingüística. [facility] [phone].    We comply with applicable federal civil rights laws and [place] laws. We do not discriminate on the basis of race, color, national origin, age, disability, sex, sexual orientation, or gender identity.            Thank you!     Thank you for choosing [facility]  for your care. Our goal is always to provide you with excellent care. Hearing back from our patients is one way we can continue to improve our services. Please take a few minutes to complete the written survey that you may receive in the mail after your visit with us. Thank you!             Your Updated Medication List - Protect others around you: Learn how to safely use, store and throw away your medicines at www.disposemymeds.org.          This list is accurate as of 6/11/18 11:59 PM.  Always use your most recent med list.                   Brand Name Dispense Instructions for use Diagnosis    Cholecalciferol 1000 UNT/0.03ML Liqd      Take 5 drops every day by oral route.    Basal cell carcinoma, forehead       * fluocinonide 0.05 % cream    LIDEX    120 g    Apply sparingly to affected area twice daily as needed.  Do not apply to face.    Basal cell carcinoma, lip, Basal cell carcinoma, forehead, Lentigo, SK (seborrheic  keratosis), History of skin cancer, Dermatitis       * fluocinonide 0.05 % solution    LIDEX    120 mL    Apply sparingly to affected area twice daily as needed on scalp .  Do not apply to face.    Basal cell carcinoma, lip, Basal cell carcinoma, forehead, Lentigo, SK (seborrheic keratosis), History of skin cancer, Dermatitis       losartan-hydrochlorothiazide 100-25 MG per tablet    HYZAAR    30 tablet    Take 1 tablet by mouth daily    Hypertension goal BP (blood pressure) < 150/90       PAZEO 0.7 % Soln   Generic drug:  Olopatadine HCl      PLACE 1 DROP IN BOTH EYES D    Basal cell carcinoma, forehead       pravastatin 10 MG tablet    PRAVACHOL    30 tablet    Take 1 tablet every 3rd day    Hyperlipidemia LDL goal <100       sertraline 50 MG tablet    ZOLOFT    30 tablet    Take 1 tablet (50 mg) by mouth daily    Major depressive disorder, recurrent episode, mild (H)       * Notice:  This list has 2 medication(s) that are the same as other medications prescribed for you. Read the directions carefully, and ask your doctor or other care provider to review them with you.

## 2018-06-11 NOTE — LETTER
6/11/2018         RE: Stephanie Kathleen  [address] [place]  [facility] [address]        Dear Colleague,    Thank you for referring your patient, Stephanie Kathleen, to the [facility]. Please see a copy of my visit note below.    Stephanie Kathleen is a 73 year old year old female patient here today for evaluation and managment of basal cell carcinoma on lip and scalp itch.  Scalp is about 50% better with topicals.  She notes hair thinning but recently had work up with nurution and is low on vit d.  Patient reports the following modifying factors none.  Associated symptoms: none.  Patient has no other skin complaints today.  Remainder of the HPI, Meds, PMH, Allergies, FH, and SH was reviewed in chart.      Past Medical History:   Diagnosis Date     Arthritis      Basal cell carcinoma      COPD (chronic obstructive pulmonary disease) (H)      Depression      Depressive disorder     MS related     HTN      Hyperlipidemia      Lung cancer (H)      MS (multiple sclerosis) (H)      Squamous cell carcinoma      TIA (transient ischaemic attack)        Past Surgical History:   Procedure Laterality Date     ABDOMEN SURGERY  1973,1975    C-sections     BIOPSY       C APPENDECTOMY       C TOTAL KNEE ARTHROPLASTY  01/2007    left knee     C TOTAL KNEE ARTHROPLASTY  1/10    right knee     C/SECTION, LOW TRANSVERSE      x 2     CARPAL TUNNEL RELEASE RT/LT      bilateral     CHOLECYSTECTOMY  2010     COLONOSCOPY  7/1/2013    Procedure: COLONOSCOPY;  colonoscopy       LOBECTOMY  02/2008    lower lobe        Family History   Problem Relation Age of Onset     CANCER Mother      ovarian     Hypertension Father      CANCER Father      Cardiovascular Father      HEART DISEASE Father      Depression Father      Arthritis Sister      CANCER Sister      ovarian and breast     Obesity Sister      Hypertension Sister      Alcohol/Drug Daughter      Depression Daughter      Arthritis Sister      CANCER Sister      Alcohol/Drug  Daughter      Depression Daughter      CANCER Paternal Grandfather      lung     Obesity Paternal Grandfather      Obesity Paternal Grandmother      Breast Cancer Sister      Melanoma No family hx of        Social History     Social History     Marital status:      Spouse name: N/A     Number of children: N/A     Years of education: N/A     Occupational History      Retired     Social History Main Topics     Smoking status: Former Smoker     Packs/day: 1.00     Years: 15.00     Types: Cigarettes     Quit date: 5/12/1999     Smokeless tobacco: Never Used      Comment: quit in 1999     Alcohol use No     Drug use: No     Sexual activity: No     Other Topics Concern     Parent/Sibling W/ Cabg, Mi Or Angioplasty Before 65f 55m? No     Social History Narrative       Outpatient Encounter Prescriptions as of 6/11/2018   Medication Sig Dispense Refill     Cholecalciferol 1000 UNT/0.03ML LIQD Take 5 drops every day by oral route.       fluocinonide (LIDEX) 0.05 % cream Apply sparingly to affected area twice daily as needed.  Do not apply to face. 120 g 3     fluocinonide (LIDEX) 0.05 % solution Apply sparingly to affected area twice daily as needed on scalp .  Do not apply to face. 120 mL 3     losartan-hydrochlorothiazide (HYZAAR) 100-25 MG per tablet Take 1 tablet by mouth daily 30 tablet 0     PAZEO 0.7 % SOLN PLACE 1 DROP IN BOTH EYES D  3     pravastatin (PRAVACHOL) 10 MG tablet Take 1 tablet every 3rd day 30 tablet 3     sertraline (ZOLOFT) 50 MG tablet Take 1 tablet (50 mg) by mouth daily 30 tablet 0     No facility-administered encounter medications on file as of 6/11/2018.              Review Of Systems  Skin: As above  Eyes: negative  Ears/Nose/Throat: negative  Respiratory: No shortness of breath, dyspnea on exertion, cough, or hemoptysis  Cardiovascular: negative  Gastrointestinal: negative  Genitourinary: negative  Musculoskeletal: negative  Neurologic: negative  Psychiatric:  negative  Hematologic/Lymphatic/Immunologic: negative  Endocrine: negative      O:   NAD, WDWN, Alert & Oriented, Mood & Affect wnl, Vitals stable   Here today alone   /78  Pulse 71  Temp 97.7  F (36.5  C) (Tympanic)   General appearance normal   Vitals stable   Alert, oriented and in no acute distress     Scalp clear   L uppe rlip 6mm scaly papule         The remainder of expanded problem focused exam was unremarkable; the following areas were examined:  scalp/hair, conjunctiva/lids, face, neck, lips/teeth, chest, digits/nails, RUE, LUE.      Eyes: Conjunctivae/lids:Normal     ENT: Lips, buccal mucosa, tongue: normal    MSK:Normal    Cardiovascular: peripheral edema none    Pulm: Breathing Normal    Neuro/Psych: Orientation:Normal; Mood/Affect:Normal      A/P:  1. Basal cell carcinoma lip  2. Scalp itch and hair thinning per patient  Hair labs workup discussed with patient she declines  claritin in am  Zyrtec bedtime  Cont topicals  Start voit d replacemnet  Return to clinic 3 months    BENIGN LESIONS DISCUSSED WITH PATIENT:  I discussed the specifics of tumor, prognosis, and genetics of benign lesions.  I explained that treatment of these lesions would be purely cosmetic and not medically neccessary.  I discussed with patient different removal options including excision, cautery and /or laser.      Skin care regimen reviewed with patient: Eliminate harsh soaps, i.e. Dial, zest, irsih spring; Mild soaps such as Cetaphil or Dove sensitive skin, avoid hot or cold showers, aggressive use of emollients including vanicream, cetaphil or cerave discussed with patient.    Return to clinic 3 months    PROCEDURE NOTE  Lupper lip basal cell carcinoma   MOHS:   Location    After PGACAC discussed with patient, decision for Mohs surgery was made. Indication for Mohs was Location. Patient confirmed the site with Dr. Hickman.  After anesthesia with LEC, the tumor was excised using standard Mohs technique in 1 stages(s).   CLEAR MARGINS OBTAINED and Final defect size was 1 cm.       REPAIR SECOND INTENT: We discussed the options for wound management in full with the patient including risks/benefits/possible outcomes. Decision made to allow the wound to heal by second intention. EBL minimal; complications none; wound care routine.  The patient was discharged in good condition and will return in one month or prn for wound evaluation.        Again, thank you for allowing me to participate in the care of your patient.        Sincerely,        Carlos Hickman MD

## 2018-06-11 NOTE — NURSING NOTE
"Initial /78  Pulse 71  Temp 97.7  F (36.5  C) (Tympanic) Estimated body mass index is 39.04 kg/(m^2) as calculated from the following:    Height as of 6/1/17: 1.638 m (5' 4.5\").    Weight as of 6/1/17: 104.8 kg (231 lb). .    Delilah Montgomery LPN    "

## 2018-06-11 NOTE — PATIENT INSTRUCTIONS
WOUND CARE INSTRUCTIONS  FOREHEAD  for  ONE WEEK AFTER SURGERY          1) Leave flat bandage on your skin for one week after today s bandage change.  2) In one week when you remove the bandage, you may resume your regular skin care routine, including washing with mild soap and water, applying moisturizer, make-up and sunscreen.    3) If there are any open or bleeding areas at the incision/graft site you should begin to cover the area with a bandage daily as follows:    1) Clean and dry the area with plain tap water using a Q-tip or sterile gauze pad.  2) Apply Polysporin or Bacitracin ointment to the open area.  3) Cover the wound with a band-aid or a sterile non-stick gauze pad and micropore paper tape.         SIGNS OF INFECTION  - If you notice any of these signs of infection, call your doctor right away: expanding redness around the wound.  - Yellow or greenish-colored pus or cloudy wound drainage.    - Red streaking spreading from the wound.  - Increased swelling, tenderness, or pain around the wound.   - Fever.    Please remember that yellow and clear drainage from a wound can be normal and related to normal wound healing.  Isolated drainage from a wound without a combination of the above features does not indicate infection.       *Once the bandages are removed, the scar will be red and firm (especially in the lip/chin area). This is normal and will fade in time. It might take 6-12 months for this to happen.     *Massaging the area will help the scar soften and fade quicker. Begin to massage the area one month after the bandages have been removed. To massage apply pressure directly and firmly over the scar with the fingertips and move in a circular motion. Massage the area for a few minutes several times a day. Continue to massage the site for several months.    *Approximately 6-8 weeks after surgery it is not uncommon to see the formation of  tender pimple-like  bump along the scar. This is normal. As the  scar continues to mature and the stitches underneath the skin begin to dissolve, this might occur. Do not pick or squeeze, this will resolve on it s own. Should one break open producing a small amount of drainage, apply Polysporin or Bacitracin ointment a few times a day until the wound is completely healed.    *Numbness in the surgical area is expected. It might take 12-18 months for the feeling to return to normal. During this time sensations of itchiness, tingling and occasional sharp pains might be noted. These feelings are normal and will subside once the nerves have completely healed.         IN CASE OF EMERGENCY: Dr Hickman [phone]     If you were seen in [place] call: [phone]    If you were seen in [place] call: [phone]

## 2018-06-11 NOTE — PROGRESS NOTES
Stephanie Kathleen is a 73 year old year old female patient here today for evaluation and managment of basal cell carcinoma on lip and scalp itch.  Scalp is about 50% better with topicals.  She notes hair thinning but recently had work up with nurution and is low on vit d.  Patient reports the following modifying factors none.  Associated symptoms: none.  Patient has no other skin complaints today.  Remainder of the HPI, Meds, PMH, Allergies, FH, and SH was reviewed in chart.      Past Medical History:   Diagnosis Date     Arthritis      Basal cell carcinoma      COPD (chronic obstructive pulmonary disease) (H)      Depression      Depressive disorder     MS related     HTN      Hyperlipidemia      Lung cancer (H)      MS (multiple sclerosis) (H)      Squamous cell carcinoma      TIA (transient ischaemic attack)        Past Surgical History:   Procedure Laterality Date     ABDOMEN SURGERY  1973,1975    C-sections     BIOPSY       C APPENDECTOMY       C TOTAL KNEE ARTHROPLASTY  01/2007    left knee     C TOTAL KNEE ARTHROPLASTY  1/10    right knee     C/SECTION, LOW TRANSVERSE      x 2     CARPAL TUNNEL RELEASE RT/LT      bilateral     CHOLECYSTECTOMY  2010     COLONOSCOPY  7/1/2013    Procedure: COLONOSCOPY;  colonoscopy       LOBECTOMY  02/2008    lower lobe        Family History   Problem Relation Age of Onset     CANCER Mother      ovarian     Hypertension Father      CANCER Father      Cardiovascular Father      HEART DISEASE Father      Depression Father      Arthritis Sister      CANCER Sister      ovarian and breast     Obesity Sister      Hypertension Sister      Alcohol/Drug Daughter      Depression Daughter      Arthritis Sister      CANCER Sister      Alcohol/Drug Daughter      Depression Daughter      CANCER Paternal Grandfather      lung     Obesity Paternal Grandfather      Obesity Paternal Grandmother      Breast Cancer Sister      Melanoma No family hx of        Social History     Social History      Marital status:      Spouse name: N/A     Number of children: N/A     Years of education: N/A     Occupational History      Retired     Social History Main Topics     Smoking status: Former Smoker     Packs/day: 1.00     Years: 15.00     Types: Cigarettes     Quit date: 5/12/1999     Smokeless tobacco: Never Used      Comment: quit in 1999     Alcohol use No     Drug use: No     Sexual activity: No     Other Topics Concern     Parent/Sibling W/ Cabg, Mi Or Angioplasty Before 65f 55m? No     Social History Narrative       Outpatient Encounter Prescriptions as of 6/11/2018   Medication Sig Dispense Refill     Cholecalciferol 1000 UNT/0.03ML LIQD Take 5 drops every day by oral route.       fluocinonide (LIDEX) 0.05 % cream Apply sparingly to affected area twice daily as needed.  Do not apply to face. 120 g 3     fluocinonide (LIDEX) 0.05 % solution Apply sparingly to affected area twice daily as needed on scalp .  Do not apply to face. 120 mL 3     losartan-hydrochlorothiazide (HYZAAR) 100-25 MG per tablet Take 1 tablet by mouth daily 30 tablet 0     PAZEO 0.7 % SOLN PLACE 1 DROP IN BOTH EYES D  3     pravastatin (PRAVACHOL) 10 MG tablet Take 1 tablet every 3rd day 30 tablet 3     sertraline (ZOLOFT) 50 MG tablet Take 1 tablet (50 mg) by mouth daily 30 tablet 0     No facility-administered encounter medications on file as of 6/11/2018.              Review Of Systems  Skin: As above  Eyes: negative  Ears/Nose/Throat: negative  Respiratory: No shortness of breath, dyspnea on exertion, cough, or hemoptysis  Cardiovascular: negative  Gastrointestinal: negative  Genitourinary: negative  Musculoskeletal: negative  Neurologic: negative  Psychiatric: negative  Hematologic/Lymphatic/Immunologic: negative  Endocrine: negative      O:   NAD, WDWN, Alert & Oriented, Mood & Affect wnl, Vitals stable   Here today alone   /78  Pulse 71  Temp 97.7  F (36.5  C) (Tympanic)   General appearance normal   Vitals  stable   Alert, oriented and in no acute distress     Scalp clear   L uppe rlip 6mm scaly papule         The remainder of expanded problem focused exam was unremarkable; the following areas were examined:  scalp/hair, conjunctiva/lids, face, neck, lips/teeth, chest, digits/nails, RUE, LUE.      Eyes: Conjunctivae/lids:Normal     ENT: Lips, buccal mucosa, tongue: normal    MSK:Normal    Cardiovascular: peripheral edema none    Pulm: Breathing Normal    Neuro/Psych: Orientation:Normal; Mood/Affect:Normal      A/P:  1. Basal cell carcinoma lip  2. Scalp itch and hair thinning per patient  Hair labs workup discussed with patient she declines  claritin in am  Zyrtec bedtime  Cont topicals  Start voit d replacemnet  Return to clinic 3 months    BENIGN LESIONS DISCUSSED WITH PATIENT:  I discussed the specifics of tumor, prognosis, and genetics of benign lesions.  I explained that treatment of these lesions would be purely cosmetic and not medically neccessary.  I discussed with patient different removal options including excision, cautery and /or laser.      Skin care regimen reviewed with patient: Eliminate harsh soaps, i.e. Dial, zest, irsih spring; Mild soaps such as Cetaphil or Dove sensitive skin, avoid hot or cold showers, aggressive use of emollients including vanicream, cetaphil or cerave discussed with patient.    Return to clinic 3 months    PROCEDURE NOTE  Lupper lip basal cell carcinoma   MOHS:   Location    After PGACAC discussed with patient, decision for Mohs surgery was made. Indication for Mohs was Location. Patient confirmed the site with Dr. Hickman.  After anesthesia with LEC, the tumor was excised using standard Mohs technique in 1 stages(s).  CLEAR MARGINS OBTAINED and Final defect size was 1 cm.       REPAIR SECOND INTENT: We discussed the options for wound management in full with the patient including risks/benefits/possible outcomes. Decision made to allow the wound to heal by second intention. EBL  minimal; complications none; wound care routine.  The patient was discharged in good condition and will return in one month or prn for wound evaluation.

## 2018-06-11 NOTE — MR AVS SNAPSHOT
After Visit Summary   6/11/2018    Stephanie Kathleen    MRN: [id number]           Patient Information     Date Of Birth          1945        Visit Information        Provider Department      6/11/2018 7:45 AM Carlos Hickman MD [facility]        Care Instructions    WOUND CARE INSTRUCTIONS  FOREHEAD  for  ONE WEEK AFTER SURGERY          1) Leave flat bandage on your skin for one week after today s bandage change.  2) In one week when you remove the bandage, you may resume your regular skin care routine, including washing with mild soap and water, applying moisturizer, make-up and sunscreen.    3) If there are any open or bleeding areas at the incision/graft site you should begin to cover the area with a bandage daily as follows:    1) Clean and dry the area with plain tap water using a Q-tip or sterile gauze pad.  2) Apply Polysporin or Bacitracin ointment to the open area.  3) Cover the wound with a band-aid or a sterile non-stick gauze pad and micropore paper tape.         SIGNS OF INFECTION  - If you notice any of these signs of infection, call your doctor right away: expanding redness around the wound.  - Yellow or greenish-colored pus or cloudy wound drainage.    - Red streaking spreading from the wound.  - Increased swelling, tenderness, or pain around the wound.   - Fever.    Please remember that yellow and clear drainage from a wound can be normal and related to normal wound healing.  Isolated drainage from a wound without a combination of the above features does not indicate infection.       *Once the bandages are removed, the scar will be red and firm (especially in the lip/chin area). This is normal and will fade in time. It might take 6-12 months for this to happen.     *Massaging the area will help the scar soften and fade quicker. Begin to massage the area one month after the bandages have been removed. To massage apply pressure directly and firmly over the scar  with the fingertips and move in a circular motion. Massage the area for a few minutes several times a day. Continue to massage the site for several months.    *Approximately 6-8 weeks after surgery it is not uncommon to see the formation of  tender pimple-like  bump along the scar. This is normal. As the scar continues to mature and the stitches underneath the skin begin to dissolve, this might occur. Do not pick or squeeze, this will resolve on it s own. Should one break open producing a small amount of drainage, apply Polysporin or Bacitracin ointment a few times a day until the wound is completely healed.    *Numbness in the surgical area is expected. It might take 12-18 months for the feeling to return to normal. During this time sensations of itchiness, tingling and occasional sharp pains might be noted. These feelings are normal and will subside once the nerves have completely healed.         IN CASE OF EMERGENCY: Dr Hickman [phone]     If you were seen in [place] call: [phone]    If you were seen in [place] call: [phone]            Follow-ups after your visit        Your next 10 appointments already scheduled     Jun 11, 2018  1:15 PM CDT   Nurse Only with [facility]   [facility]    [address] [id number] [phone]            Jun 25, 2018  1:00 PM CDT   Return Visit with Carlos Hickman MD   [facility]    [address] [id number] [phone]              Who to contact     If you have questions or need follow up information about today's clinic visit or your schedule please contact [facility] directly at [phone].  Normal or non-critical lab and imaging results will be communicated to you by MyChart, letter or phone within 4 business days after the clinic has received the results. If you do not hear from us within 7 days, please  contact the clinic through [employer] or phone. If you have a critical or abnormal lab result, we will notify you by phone as soon as possible.  Submit refill requests through [employer] or call your pharmacy and they will forward the refill request to us. Please allow 3 business days for your refill to be completed.          Additional Information About Your Visit        [employer] Information     [employer] gives you secure access to your electronic health record. If you see a primary care provider, you can also send messages to your care team and make appointments. If you have questions, please call your primary care clinic.  If you do not have a primary care provider, please call [phone] and they will assist you.        Care EveryWhere ID     This is your Care EveryWhere ID. This could be used by other organizations to access your [facility] medical records  [id number]        Your Vitals Were     Pulse Temperature                71 97.7  F (36.5  C) (Tympanic)           Blood Pressure from Last 3 Encounters:   06/11/18 136/78   06/04/18 125/71   05/14/18 148/85    Weight from Last 3 Encounters:   06/01/17 104.8 kg (231 lb)   12/13/16 104.8 kg (231 lb)   05/12/16 104.3 kg (230 lb)              Today, you had the following     No orders found for display       Primary Care Provider Office Phone # Fax #    Stephanie Anabella Olsen [phone] [address] [facility] [address]        Equal Access to Services     SANNA MENDOZA : Hadii aad ku hadasho Soomaali, waaxda luqadaha, qaybta kaalmada adeegyada, tegan velasco. So [facility] [phone].    ATENCIÓN: Si habla español, tiene a bowser disposición servicios gratuitos de asistencia lingüística. Montse al [phone].    We comply with applicable federal civil rights laws and [place] laws. We do not discriminate on the basis of race, color, national origin, age, disability, sex, sexual orientation, or gender identity.             Thank you!     Thank you for choosing [facility]  for your care. Our goal is always to provide you with excellent care. Hearing back from our patients is one way we can continue to improve our services. Please take a few minutes to complete the written survey that you may receive in the mail after your visit with us. Thank you!             Your Updated Medication List - Protect others around you: Learn how to safely use, store and throw away your medicines at www.disposemymeds.org.          This list is accurate as of 6/11/18  8:44 AM.  Always use your most recent med list.                   Brand Name Dispense Instructions for use Diagnosis    Cholecalciferol 1000 UNT/0.03ML Liqd      Take 5 drops every day by oral route.    Basal cell carcinoma, forehead       * fluocinonide 0.05 % cream    LIDEX    120 g    Apply sparingly to affected area twice daily as needed.  Do not apply to face.    Basal cell carcinoma, lip, Basal cell carcinoma, forehead, Lentigo, SK (seborrheic keratosis), History of skin cancer, Dermatitis       * fluocinonide 0.05 % solution    LIDEX    120 mL    Apply sparingly to affected area twice daily as needed on scalp .  Do not apply to face.    Basal cell carcinoma, lip, Basal cell carcinoma, forehead, Lentigo, SK (seborrheic keratosis), History of skin cancer, Dermatitis       losartan-hydrochlorothiazide 100-25 MG per tablet    HYZAAR    30 tablet    Take 1 tablet by mouth daily    Hypertension goal BP (blood pressure) < 150/90       PAZEO 0.7 % Soln   Generic drug:  Olopatadine HCl      PLACE 1 DROP IN BOTH EYES D    Basal cell carcinoma, forehead       pravastatin 10 MG tablet    PRAVACHOL    30 tablet    Take 1 tablet every 3rd day    Hyperlipidemia LDL goal <100       sertraline 50 MG tablet    ZOLOFT    30 tablet    Take 1 tablet (50 mg) by mouth daily    Major depressive disorder, recurrent episode, mild (H)       * Notice:  This list has 2 medication(s) that are the  same as other medications prescribed for you. Read the directions carefully, and ask your doctor or other care provider to review them with you.

## 2018-06-18 DIAGNOSIS — E78.5 HYPERLIPIDEMIA LDL GOAL <100: ICD-10-CM

## 2018-06-20 RX ORDER — PRAVASTATIN SODIUM 10 MG
TABLET ORAL
Qty: 30 TABLET | Refills: 0 | Status: SHIPPED | OUTPATIENT
Start: 2018-06-20

## 2018-06-22 DIAGNOSIS — E78.5 HYPERLIPIDEMIA LDL GOAL <100: ICD-10-CM

## 2018-06-22 RX ORDER — PRAVASTATIN SODIUM 10 MG
TABLET ORAL
Qty: 30 TABLET | Refills: 0 | OUTPATIENT
Start: 2018-06-22

## 2018-12-17 ENCOUNTER — IMPORTED ENCOUNTER (OUTPATIENT)
Dept: URBAN - METROPOLITAN AREA CLINIC 31 | Facility: CLINIC | Age: 73
End: 2018-12-17

## 2018-12-17 PROBLEM — H25.13: Noted: 2018-12-17

## 2018-12-17 PROBLEM — H43.812: Noted: 2018-12-17

## 2018-12-17 PROBLEM — H02.834: Noted: 2018-12-17

## 2018-12-17 PROBLEM — H02.831: Noted: 2018-12-17

## 2018-12-17 PROCEDURE — 99214 OFFICE O/P EST MOD 30 MIN: CPT

## 2018-12-17 NOTE — PATIENT DISCUSSION
1.  Discussed the risks benefits alternatives and limitations of cataract surgery including infection bleeding loss of vision retinal tears detachment. The patient stated a full understanding and a desire to proceed with the procedure in both eyes. Refractive options were reviewed. Patient has elected to be optimized for distance vision in both eyes. The patient will still need glasses for reading and to possibly fine tune distance vision. Schedule KPE/IOL OS/OD. Paz Hale. PVD OS: Patient was cautioned to call our office immediately if they experience a substantial change in their symptoms such as an increase in floaters persistent flashes loss of visual field (may appear as a shadow or a curtain) or decrease in visual acuity as these may indicate a retinal tear or detachment. If this is a new problem patient will need to return for re-examination  as determined by the [address] [employer]. Dermatochalasis/ UPper eyleid ptosis OU /HANY papilloma : Followed by Dr Paco Ewing.

## 2019-01-03 ENCOUNTER — IMPORTED ENCOUNTER (OUTPATIENT)
Dept: URBAN - METROPOLITAN AREA CLINIC 31 | Facility: CLINIC | Age: 74
End: 2019-01-03

## 2019-01-03 PROBLEM — H25.13: Noted: 2019-01-03

## 2019-04-24 ENCOUNTER — IMPORTED ENCOUNTER (OUTPATIENT)
Dept: URBAN - METROPOLITAN AREA CLINIC 31 | Facility: CLINIC | Age: 74
End: 2019-04-24

## 2019-04-24 PROBLEM — H43.811: Noted: 2019-04-24

## 2019-04-24 PROBLEM — L71.9: Noted: 2019-04-24

## 2019-04-24 PROBLEM — H25.813: Noted: 2019-04-24

## 2019-04-24 PROBLEM — H16.222: Noted: 2019-04-24

## 2019-04-24 PROCEDURE — 92250 FUNDUS PHOTOGRAPHY W/I&R: CPT

## 2019-04-24 PROCEDURE — 92014 COMPRE OPH EXAM EST PT 1/>: CPT

## 2019-04-24 NOTE — PATIENT DISCUSSION
1. Combined Types of Cataract OU: Explained how cataracts can effect vision. Recommend clinical observation. The patient was advised to contact us if any change or worsening of vision. 2. PVD OD: Patient was cautioned to call our office immediately if they experience a substantial change in their symptoms such as an increase in floaters persistent flashes loss of visual field (may appear as a shadow or a curtain) or decrease in visual acuity as these may indicate a retinal tear or detachment. If this is a new problem patient will need to return for re-examination  as determined by the [address] [employer]. Keratoconjunctivitis Sicca OS:  Continue current management. 4.  SP BULB with Dr. Mariajose Groves. Return for an appointment in 6 weeks for office call and refraction with Dr. Catarina Hayes.

## 2019-04-24 NOTE — PATIENT DISCUSSION
1.  Facial Rosacea - Patient found to have skin changes consistant with Rosacea and ocular surface changes that appear to be related. Start doxycycline 100mg QD. Follow up in 2 months. 2. Combined Types of Cataract OU: Explained how cataracts can effect vision. Recommend clinical observation. The patient was advised to contact us if any change or worsening of vision. 3. PVD OD: Patient was cautioned to call our office immediately if they experience a substantial change in their symptoms such as an increase in floaters persistent flashes loss of visual field (may appear as a shadow or a curtain) or decrease in visual acuity as these may indicate a retinal tear or detachment. If this is a new problem patient will need to return for re-examination  as determined by the [facility] [employer] 10. Keratoconjunctivitis Sicca OS:  Continue current management. 5.  SP BULB with Dr. Brain Pérez. Return for an appointment in 6 weeks for office call and refraction with Dr. Manas Portillo.

## 2019-06-03 ENCOUNTER — IMPORTED ENCOUNTER (OUTPATIENT)
Dept: URBAN - METROPOLITAN AREA CLINIC 31 | Facility: CLINIC | Age: 74
End: 2019-06-03

## 2019-06-03 PROBLEM — L71.9: Noted: 2019-06-03

## 2019-06-03 PROBLEM — H25.813: Noted: 2019-06-03

## 2019-06-03 PROBLEM — H43.811: Noted: 2019-06-03

## 2019-06-03 PROBLEM — H16.222: Noted: 2019-06-03

## 2019-06-03 PROCEDURE — 99213 OFFICE O/P EST LOW 20 MIN: CPT

## 2019-06-03 NOTE — PATIENT DISCUSSION
1.  Facial Rosacea - Improving. Continue doxycycline 100mg QD. 2.  Combined Types of Cataract OU: monitor. 3. PVD OD: monitor. 4. Keratoconjunctivitis Sicca OS:  Continue current management. 5.  SP BULB with Dr. Rodrigo Rucker. Return for an appointment in 6 months for office call. with Dr. Lilian Keating.

## 2019-06-11 ENCOUNTER — OFFICE VISIT (OUTPATIENT)
Dept: DERMATOLOGY | Facility: CLINIC | Age: 74
End: 2019-06-11
Payer: MEDICARE

## 2019-06-11 VITALS
BODY MASS INDEX: 39.65 KG/M2 | SYSTOLIC BLOOD PRESSURE: 136 MMHG | DIASTOLIC BLOOD PRESSURE: 87 MMHG | HEIGHT: 64 IN | HEART RATE: 83 BPM

## 2019-06-11 DIAGNOSIS — D23.9 DERMAL NEVUS: ICD-10-CM

## 2019-06-11 DIAGNOSIS — L81.4 LENTIGO: ICD-10-CM

## 2019-06-11 DIAGNOSIS — D18.00 ANGIOMA: ICD-10-CM

## 2019-06-11 DIAGNOSIS — L82.1 SEBORRHEIC KERATOSIS: ICD-10-CM

## 2019-06-11 DIAGNOSIS — Z85.828 HISTORY OF SKIN CANCER: Primary | ICD-10-CM

## 2019-06-11 PROCEDURE — 99213 OFFICE O/P EST LOW 20 MIN: CPT | Performed by: DERMATOLOGY

## 2019-06-11 NOTE — PROGRESS NOTES
Stephanie Kathleen is a 74 year old year old female patient here today for hx of non-melanoma skin cancer.  She denies any new or changing skin lesions.   .  Patient states this has been present for none.  Patient reports the following symptoms:  none.  Patient reports the following previous treatments none.  Patient reports the following modifying factors none.  Associated symptoms: none.  Patient has no other skin complaints today.  Remainder of the HPI, Meds, PMH, Allergies, FH, and SH was reviewed in chart.      Past Medical History:   Diagnosis Date     Arthritis      Basal cell carcinoma      COPD (chronic obstructive pulmonary disease) (H)      Depression      Depressive disorder     MS related     HTN      Hyperlipidemia      Lung cancer (H)      MS (multiple sclerosis) (H)      Squamous cell carcinoma      TIA (transient ischaemic attack)        Past Surgical History:   Procedure Laterality Date     ABDOMEN SURGERY  1973,1975    C-sections     BIOPSY       C APPENDECTOMY       C TOTAL KNEE ARTHROPLASTY  01/2007    left knee     C TOTAL KNEE ARTHROPLASTY  1/10    right knee     C/SECTION, LOW TRANSVERSE      x 2     CARPAL TUNNEL RELEASE RT/LT      bilateral     CHOLECYSTECTOMY  2010     COLONOSCOPY  7/1/2013    Procedure: COLONOSCOPY;  colonoscopy       LOBECTOMY  02/2008    lower lobe        Family History   Problem Relation Age of Onset     Cancer Mother         ovarian     Hypertension Father      Cancer Father      Cardiovascular Father      Heart Disease Father      Depression Father      Arthritis Sister      Cancer Sister         ovarian and breast     Obesity Sister      Hypertension Sister      Alcohol/Drug Daughter      Depression Daughter      Arthritis Sister      Cancer Sister      Alcohol/Drug Daughter      Depression Daughter      Cancer Paternal Grandfather         lung     Obesity Paternal Grandfather      Obesity Paternal Grandmother      Breast Cancer Sister      Melanoma No family hx of         Social History     Socioeconomic History     Marital status:      Spouse name: Not on file     Number of children: Not on file     Years of education: Not on file     Highest education level: Not on file   Occupational History     Employer: RETIRED   Social Needs     Financial resource strain: Not on file     Food insecurity:     Worry: Not on file     Inability: Not on file     Transportation needs:     Medical: Not on file     Non-medical: Not on file   Tobacco Use     Smoking status: Former Smoker     Packs/day: 1.00     Years: 15.00     Pack years: 15.00     Types: Cigarettes     Last attempt to quit: 1999     Years since quittin.0     Smokeless tobacco: Never Used     Tobacco comment: quit in    Substance and Sexual Activity     Alcohol use: No     Drug use: No     Sexual activity: Never   Lifestyle     Physical activity:     Days per week: Not on file     Minutes per session: Not on file     Stress: Not on file   Relationships     Social connections:     Talks on phone: Not on file     Gets together: Not on file     Attends Orthodox service: Not on file     Active member of club or organization: Not on file     Attends meetings of clubs or organizations: Not on file     Relationship status: Not on file     Intimate partner violence:     Fear of current or ex partner: Not on file     Emotionally abused: Not on file     Physically abused: Not on file     Forced sexual activity: Not on file   Other Topics Concern     Parent/sibling w/ CABG, MI or angioplasty before 65F 55M? No   Social History Narrative     Not on file       Outpatient Encounter Medications as of 2019   Medication Sig Dispense Refill     Cholecalciferol 1000 UNT/0.03ML LIQD Take 5 drops every day by oral route.       fluocinonide (LIDEX) 0.05 % cream Apply sparingly to affected area twice daily as needed.  Do not apply to face. 120 g 3     fluocinonide (LIDEX) 0.05 % solution Apply sparingly to affected area twice  "daily as needed on scalp .  Do not apply to face. 120 mL 3     losartan-hydrochlorothiazide (HYZAAR) 100-25 MG per tablet Take 1 tablet by mouth daily 30 tablet 0     PAZEO 0.7 % SOLN PLACE 1 DROP IN BOTH EYES D  3     pravastatin (PRAVACHOL) 10 MG tablet Take 1 tablet every 3rd day 30 tablet 0     [DISCONTINUED] sertraline (ZOLOFT) 50 MG tablet Take 1 tablet (50 mg) by mouth daily (Patient not taking: Reported on 6/11/2019) 30 tablet 0     No facility-administered encounter medications on file as of 6/11/2019.              Review Of Systems  Skin: As above  Eyes: negative  Ears/Nose/Throat: negative  Respiratory: No shortness of breath, dyspnea on exertion, cough, or hemoptysis  Cardiovascular: negative  Gastrointestinal: negative  Genitourinary: negative  Musculoskeletal: negative  Neurologic: negative  Psychiatric: negative  Hematologic/Lymphatic/Immunologic: negative  Endocrine: negative      O:   NAD, WDWN, Alert & Oriented, Mood & Affect wnl, Vitals stable   Here today alone   /87   Pulse 83   Ht 1.626 m (5' 4\")   BMI 39.65 kg/m     General appearance normal   Vitals stable   Alert, oriented and in no acute distress      Following lymph nodes palpated: Occipital, Cervical, Supraclavicular no lad        Stuck on papules and brown macules on trunk and ext   Red papules on trunk  Flesh colored papules on trunk     The remainder of the full exam was unremarkable; the following areas were examined:  conjunctiva/lids, oral mucosa, neck, peripheral vascular system, abdomen, lymph nodes, digits/nails, eccrine and apocrine glands, scalp/hair, face, neck, chest, abdomen, buttocks, back, RUE, LUE, RLE, LLE       Eyes: Conjunctivae/lids:Normal     ENT: Lips, buccal mucosa, tongue: normal    MSK:Normal    Cardiovascular: peripheral edema none    Pulm: Breathing Normal    Lymph Nodes: No Head and Neck Lymphadenopathy     Neuro/Psych: Orientation:Normal; Mood/Affect:Normal      A/P:  1. Seborrheic keratosis, " lentigo, angioma, dermal nevus, hx of non-melanoma skin cancer   BENIGN LESIONS DISCUSSED WITH PATIENT:  I discussed the specifics of tumor, prognosis, and genetics of benign lesions.  I explained that treatment of these lesions would be purely cosmetic and not medically neccessary.  I discussed with patient different removal options including excision, cautery and /or laser.      Nature and genetics of benign skin lesions dicussed with patient.  Signs and Symptoms of skin cancer discussed with patient.  ABCDEs of melanoma reviewed with patient.  Patient encouraged to perform monthly skin exams.  UV precautions reviewed with patient.  Patient to follow up with Primary Care provider regarding elevated blood pressure.  Skin care regimen reviewed with patient: Eliminate harsh soaps, i.e. Dial, zest, irsih spring; Mild soaps such as Cetaphil or Dove sensitive skin, avoid hot or cold showers, aggressive use of emollients including vanicream, cetaphil or cerave discussed with patient.    Risks of non-melanoma skin cancer discussed with patient   Return to clinic 12 months

## 2019-06-11 NOTE — LETTER
6/11/2019         RE: Stephanie Kathleen  [address]  [place] [address]        Dear Colleague,    Thank you for referring your patient, Stephanie Kathleen, to the [facility]. Please see a copy of my visit note below.    Stephanie Kathleen is a 74 year old year old female patient here today for hx of non-melanoma skin cancer.  She denies any new or changing skin lesions.   .  Patient states this has been present for none.  Patient reports the following symptoms:  none.  Patient reports the following previous treatments none.  Patient reports the following modifying factors none.  Associated symptoms: none.  Patient has no other skin complaints today.  Remainder of the HPI, Meds, PMH, Allergies, FH, and SH was reviewed in chart.      Past Medical History:   Diagnosis Date     Arthritis      Basal cell carcinoma      COPD (chronic obstructive pulmonary disease) (H)      Depression      Depressive disorder     MS related     HTN      Hyperlipidemia      Lung cancer (H)      MS (multiple sclerosis) (H)      Squamous cell carcinoma      TIA (transient ischaemic attack)        Past Surgical History:   Procedure Laterality Date     ABDOMEN SURGERY  1973,1975    C-sections     BIOPSY       C APPENDECTOMY       C TOTAL KNEE ARTHROPLASTY  01/2007    left knee     C TOTAL KNEE ARTHROPLASTY  1/10    right knee     C/SECTION, LOW TRANSVERSE      x 2     CARPAL TUNNEL RELEASE RT/LT      bilateral     CHOLECYSTECTOMY  2010     COLONOSCOPY  7/1/2013    Procedure: COLONOSCOPY;  colonoscopy       LOBECTOMY  02/2008    lower lobe        Family History   Problem Relation Age of Onset     Cancer Mother         ovarian     Hypertension Father      Cancer Father      Cardiovascular Father      Heart Disease Father      Depression Father      Arthritis Sister      Cancer Sister         ovarian and breast     Obesity Sister      Hypertension Sister      Alcohol/Drug Daughter      Depression Daughter      Arthritis Sister       Cancer Sister      Alcohol/Drug Daughter      Depression Daughter      Cancer Paternal Grandfather         lung     Obesity Paternal Grandfather      Obesity Paternal Grandmother      Breast Cancer Sister      Melanoma No family hx of        Social History     Socioeconomic History     Marital status:      Spouse name: Not on file     Number of children: Not on file     Years of education: Not on file     Highest education level: Not on file   Occupational History     Employer: RETIRED   Social Needs     Financial resource strain: Not on file     Food insecurity:     Worry: Not on file     Inability: Not on file     Transportation needs:     Medical: Not on file     Non-medical: Not on file   Tobacco Use     Smoking status: Former Smoker     Packs/day: 1.00     Years: 15.00     Pack years: 15.00     Types: Cigarettes     Last attempt to quit: 1999     Years since quittin.0     Smokeless tobacco: Never Used     Tobacco comment: quit in    Substance and Sexual Activity     Alcohol use: No     Drug use: No     Sexual activity: Never   Lifestyle     Physical activity:     Days per week: Not on file     Minutes per session: Not on file     Stress: Not on file   Relationships     Social connections:     Talks on phone: Not on file     Gets together: Not on file     Attends Druze service: Not on file     Active member of club or organization: Not on file     Attends meetings of clubs or organizations: Not on file     Relationship status: Not on file     Intimate partner violence:     Fear of current or ex partner: Not on file     Emotionally abused: Not on file     Physically abused: Not on file     Forced sexual activity: Not on file   Other Topics Concern     Parent/sibling w/ CABG, MI or angioplasty before 65F 55M? No   Social History Narrative     Not on file       Outpatient Encounter Medications as of 2019   Medication Sig Dispense Refill     Cholecalciferol 1000 UNT/0.03ML LIQD Take  "5 drops every day by oral route.       fluocinonide (LIDEX) 0.05 % cream Apply sparingly to affected area twice daily as needed.  Do not apply to face. 120 g 3     fluocinonide (LIDEX) 0.05 % solution Apply sparingly to affected area twice daily as needed on scalp .  Do not apply to face. 120 mL 3     losartan-hydrochlorothiazide (HYZAAR) 100-25 MG per tablet Take 1 tablet by mouth daily 30 tablet 0     PAZEO 0.7 % SOLN PLACE 1 DROP IN BOTH EYES D  3     pravastatin (PRAVACHOL) 10 MG tablet Take 1 tablet every 3rd day 30 tablet 0     [DISCONTINUED] sertraline (ZOLOFT) 50 MG tablet Take 1 tablet (50 mg) by mouth daily (Patient not taking: Reported on 6/11/2019) 30 tablet 0     No facility-administered encounter medications on file as of 6/11/2019.              Review Of Systems  Skin: As above  Eyes: negative  Ears/Nose/Throat: negative  Respiratory: No shortness of breath, dyspnea on exertion, cough, or hemoptysis  Cardiovascular: negative  Gastrointestinal: negative  Genitourinary: negative  Musculoskeletal: negative  Neurologic: negative  Psychiatric: negative  Hematologic/Lymphatic/Immunologic: negative  Endocrine: negative      O:   NAD, WDWN, Alert & Oriented, Mood & Affect wnl, Vitals stable   Here today alone   /87   Pulse 83   Ht 1.626 m (5' 4\")   BMI 39.65 kg/m      General appearance normal   Vitals stable   Alert, oriented and in no acute distress      Following lymph nodes palpated: Occipital, Cervical, Supraclavicular no lad        Stuck on papules and brown macules on trunk and ext   Red papules on trunk  Flesh colored papules on trunk     The remainder of the full exam was unremarkable; the following areas were examined:  conjunctiva/lids, oral mucosa, neck, peripheral vascular system, abdomen, lymph nodes, digits/nails, eccrine and apocrine glands, scalp/hair, face, neck, chest, abdomen, buttocks, back, RUE, LUE, RLE, LLE       Eyes: Conjunctivae/lids:Normal     ENT: Lips, buccal mucosa, " tongue: normal    MSK:Normal    Cardiovascular: peripheral edema none    Pulm: Breathing Normal    Lymph Nodes: No Head and Neck Lymphadenopathy     Neuro/Psych: Orientation:Normal; Mood/Affect:Normal      A/P:  1. Seborrheic keratosis, lentigo, angioma, dermal nevus, hx of non-melanoma skin cancer   BENIGN LESIONS DISCUSSED WITH PATIENT:  I discussed the specifics of tumor, prognosis, and genetics of benign lesions.  I explained that treatment of these lesions would be purely cosmetic and not medically neccessary.  I discussed with patient different removal options including excision, cautery and /or laser.      Nature and genetics of benign skin lesions dicussed with patient.  Signs and Symptoms of skin cancer discussed with patient.  ABCDEs of melanoma reviewed with patient.  Patient encouraged to perform monthly skin exams.  UV precautions reviewed with patient.  Patient to follow up with Primary Care provider regarding elevated blood pressure.  Skin care regimen reviewed with patient: Eliminate harsh soaps, i.e. Dial, zest, irsih spring; Mild soaps such as Cetaphil or Dove sensitive skin, avoid hot or cold showers, aggressive use of emollients including vanicream, cetaphil or cerave discussed with patient.    Risks of non-melanoma skin cancer discussed with patient   Return to clinic 12 months      Again, thank you for allowing me to participate in the care of your patient.        Sincerely,        Carlos Hickman MD

## 2019-06-11 NOTE — NURSING NOTE
"Initial /87   Pulse 83   Ht 1.626 m (5' 4\")   BMI 39.65 kg/m   Estimated body mass index is 39.65 kg/m  as calculated from the following:    Height as of this encounter: 1.626 m (5' 4\").    Weight as of 6/1/17: 104.8 kg (231 lb). .      "

## 2019-10-02 ENCOUNTER — HEALTH MAINTENANCE LETTER (OUTPATIENT)
Age: 74
End: 2019-10-02

## 2019-12-02 ENCOUNTER — IMPORTED ENCOUNTER (OUTPATIENT)
Dept: URBAN - METROPOLITAN AREA CLINIC 31 | Facility: CLINIC | Age: 74
End: 2019-12-02

## 2019-12-02 PROBLEM — L71.9: Noted: 2019-12-02

## 2019-12-02 PROBLEM — Z96.1: Noted: 2019-12-02

## 2019-12-02 PROBLEM — H25.813: Noted: 2019-12-02

## 2019-12-02 PROBLEM — H04.123: Noted: 2019-12-02

## 2019-12-02 PROCEDURE — 99213 OFFICE O/P EST LOW 20 MIN: CPT

## 2019-12-02 PROCEDURE — 92015 DETERMINE REFRACTIVE STATE: CPT

## 2019-12-02 NOTE — PATIENT DISCUSSION
1.  Facial Rosacea - Doing better. Continue doxy as doing. 2. Dry Eye OU:  Continue current management with Artificial Tears. Sample of newer Refresh Relieva given to see if prefers to Repair. 3.  Combined Types of Cataract OU: monitor. 4. Return for an appointment in 4 months for comprehensive exam. with Dr. Pa Dickens.

## 2019-12-16 ENCOUNTER — HEALTH MAINTENANCE LETTER (OUTPATIENT)
Age: 74
End: 2019-12-16

## 2020-06-02 ENCOUNTER — IMPORTED ENCOUNTER (OUTPATIENT)
Dept: URBAN - METROPOLITAN AREA CLINIC 31 | Facility: CLINIC | Age: 75
End: 2020-06-02

## 2020-06-02 PROBLEM — H10.403: Noted: 2020-06-02

## 2020-06-02 PROBLEM — L71.9: Noted: 2020-06-02

## 2020-06-02 PROBLEM — H25.813: Noted: 2020-06-02

## 2020-06-02 PROBLEM — H04.123: Noted: 2020-06-02

## 2020-06-02 PROCEDURE — 92014 COMPRE OPH EXAM EST PT 1/>: CPT

## 2020-06-02 NOTE — PATIENT DISCUSSION
1.  Allergic Conjunctivitis OU -- Alaway or Zaditor PRN. If does not work then try [employer]. 2. Dry Eye OU:  Continue current management with Artificial Tears. 3.  Combined Types of Cataract OU: monitor4. Return for an appointment in 1 year for comprehensive exam. with Dr. Selam Campbell. 5.  Facial Rosacea - Stable. Sensitive to doxy.

## 2021-01-15 ENCOUNTER — HEALTH MAINTENANCE LETTER (OUTPATIENT)
Age: 76
End: 2021-01-15

## 2021-09-04 ENCOUNTER — HEALTH MAINTENANCE LETTER (OUTPATIENT)
Age: 76
End: 2021-09-04

## 2021-10-30 ENCOUNTER — HEALTH MAINTENANCE LETTER (OUTPATIENT)
Age: 76
End: 2021-10-30

## 2022-01-18 ENCOUNTER — IMPORTED ENCOUNTER (OUTPATIENT)
Dept: URBAN - METROPOLITAN AREA CLINIC 31 | Facility: CLINIC | Age: 77
End: 2022-01-18

## 2022-01-18 PROBLEM — H04.123: Noted: 2022-01-18

## 2022-01-18 PROBLEM — H25.813: Noted: 2022-01-18

## 2022-01-18 PROBLEM — L71.9: Noted: 2022-01-18

## 2022-01-18 PROBLEM — H25.812: Noted: 2022-01-18

## 2022-01-18 PROBLEM — H10.403: Noted: 2022-01-18

## 2022-01-18 PROBLEM — H25.811: Noted: 2022-01-18

## 2022-01-18 PROCEDURE — 92015 DETERMINE REFRACTIVE STATE: CPT

## 2022-01-18 PROCEDURE — 99214 OFFICE O/P EST MOD 30 MIN: CPT

## 2022-01-18 NOTE — PATIENT DISCUSSION
1. Combined Types of Cataract OU: monitor. 2. Allergic Conjunctivitis OU --  Pataday PRN. 3. Dry Eye OU:  Continue current management with Thera Tears. 4.  Facial Rosacea - Stable. Sensitive to doxy. 5. Return for an appointment in 1 year for comprehensive exam with Dr. Adilene Jones.

## 2022-02-19 ENCOUNTER — HEALTH MAINTENANCE LETTER (OUTPATIENT)
Age: 77
End: 2022-02-19

## 2022-04-02 ASSESSMENT — VISUAL ACUITY
OD_GLARE: 20/50
OS_PH: SC 20/25 +1
OS_SC: J7
OS_PH: 20/50
OD_CC: 20/40-2
OD_CC: 20/50-2
OD_PH: 20/30
OD_GLARE: 20/40MED
OS_CC: 20/80
OS_CC: 20/60
OD_CC: 20/50
OS_PH: SC 20/40
OD_CC: 20/50
OS_CC: J1
OS_GLARE: 20/80
OD_CC: 20/30-2
OD_CC: J2
OD_SC: 20/25-2
OS_CC: 20/40-2
OS_CC: 20/50
OS_CC: 20/40
OD_CC: 20/40
OD_CC: 20/25+1
OS_CC: 20/40+2
OS_PH: SC 20/30 +1
OD_PH: SC 20/30
OS_GLARE: 20/40MED
OU_CC: 20/25
OS_CC: 20/60
OS_SC: 20/25-1
OD_PH: SC 20/25 +1
OD_CC: 20/30+1
OD_SC: J7
OS_CC: 20/60

## 2022-04-02 ASSESSMENT — TONOMETRY
OD_IOP_MMHG: 14
OS_IOP_MMHG: 17
OS_IOP_MMHG: 15
OS_IOP_MMHG: 17
OS_IOP_MMHG: 13
OD_IOP_MMHG: 17
OS_IOP_MMHG: 13
OS_IOP_MMHG: 12
OD_IOP_MMHG: 15
OD_IOP_MMHG: 13
OD_IOP_MMHG: 16
OD_IOP_MMHG: 13

## 2024-02-01 ENCOUNTER — COMPREHENSIVE EXAM (OUTPATIENT)
Dept: URBAN - METROPOLITAN AREA CLINIC 29 | Facility: CLINIC | Age: 79
End: 2024-02-01

## 2024-02-01 DIAGNOSIS — H52.4: ICD-10-CM

## 2024-02-01 DIAGNOSIS — H25.813: ICD-10-CM

## 2024-02-01 DIAGNOSIS — H52.223: ICD-10-CM

## 2024-02-01 DIAGNOSIS — L71.9: ICD-10-CM

## 2024-02-01 DIAGNOSIS — H52.03: ICD-10-CM

## 2024-02-01 DIAGNOSIS — H04.123: ICD-10-CM

## 2024-02-01 PROCEDURE — 92015 DETERMINE REFRACTIVE STATE: CPT

## 2024-02-01 PROCEDURE — 99214 OFFICE O/P EST MOD 30 MIN: CPT

## 2024-02-01 ASSESSMENT — KERATOMETRY
OS_K2POWER_DIOPTERS: 44.50
OS_K1POWER_DIOPTERS: 44.00
OS_AXISANGLE_DEGREES: 150
OD_K1POWER_DIOPTERS: 44.25
OS_AXISANGLE2_DEGREES: 060
OD_AXISANGLE_DEGREES: 020
OD_AXISANGLE2_DEGREES: 110
OD_K2POWER_DIOPTERS: 44.75

## 2024-02-01 ASSESSMENT — VISUAL ACUITY
OD_PH: 20/25+1
OS_SC: 20/50-1
OD_SC: 20/40-2
OS_PH: 20/30-1

## 2024-02-01 ASSESSMENT — TONOMETRY
OD_IOP_MMHG: 15
OS_IOP_MMHG: 16

## 2024-05-01 ENCOUNTER — CONSULTATION/EVALUATION (OUTPATIENT)
Dept: URBAN - METROPOLITAN AREA CLINIC 29 | Facility: CLINIC | Age: 79
End: 2024-05-01

## 2024-05-01 DIAGNOSIS — G35: ICD-10-CM

## 2024-05-01 DIAGNOSIS — H25.813: ICD-10-CM

## 2024-05-01 DIAGNOSIS — H04.123: ICD-10-CM

## 2024-05-01 PROCEDURE — 76519 ECHO EXAM OF EYE: CPT

## 2024-05-01 PROCEDURE — 99214 OFFICE O/P EST MOD 30 MIN: CPT

## 2024-05-01 ASSESSMENT — KERATOMETRY
OD_K2POWER_DIOPTERS: 44.75
OS_K1POWER_DIOPTERS: 44.00
OS_AXISANGLE_DEGREES: 150
OS_AXISANGLE2_DEGREES: 060
OD_AXISANGLE_DEGREES: 020
OD_AXISANGLE2_DEGREES: 110
OS_K2POWER_DIOPTERS: 44.50
OD_K1POWER_DIOPTERS: 44.25

## 2024-05-01 ASSESSMENT — VISUAL ACUITY
OS_SC: 20/50
OS_BAT: 20/50
OD_BAT: 20/60
OD_SC: 20/40-2

## 2024-05-01 ASSESSMENT — TONOMETRY
OD_IOP_MMHG: 15
OS_IOP_MMHG: 17

## 2024-06-04 ENCOUNTER — SURGERY/PROCEDURE (OUTPATIENT)
Dept: URBAN - METROPOLITAN AREA SURGERY 17 | Facility: SURGERY | Age: 79
End: 2024-06-04

## 2024-06-04 DIAGNOSIS — H25.811: ICD-10-CM

## 2024-06-04 PROCEDURE — 66984 XCAPSL CTRC RMVL W/O ECP: CPT

## 2024-06-05 ENCOUNTER — POST OP/EVAL OF SECOND EYE (OUTPATIENT)
Dept: URBAN - METROPOLITAN AREA CLINIC 29 | Facility: CLINIC | Age: 79
End: 2024-06-05

## 2024-06-05 DIAGNOSIS — Z96.1: ICD-10-CM

## 2024-06-05 DIAGNOSIS — H25.812: ICD-10-CM

## 2024-06-05 DIAGNOSIS — G35: ICD-10-CM

## 2024-06-05 DIAGNOSIS — H04.123: ICD-10-CM

## 2024-06-05 PROCEDURE — 99024 POSTOP FOLLOW-UP VISIT: CPT

## 2024-06-05 ASSESSMENT — KERATOMETRY
OD_K1POWER_DIOPTERS: 44.25
OD_AXISANGLE2_DEGREES: 110
OS_AXISANGLE_DEGREES: 150
OS_K2POWER_DIOPTERS: 44.50
OD_AXISANGLE_DEGREES: 020
OS_AXISANGLE2_DEGREES: 060
OD_AXISANGLE2_DEGREES: 110
OD_AXISANGLE_DEGREES: 020
OD_K2POWER_DIOPTERS: 44.75
OS_AXISANGLE_DEGREES: 150
OD_K1POWER_DIOPTERS: 44.25
OS_K1POWER_DIOPTERS: 44.00
OS_K2POWER_DIOPTERS: 44.50
OD_K2POWER_DIOPTERS: 44.75
OS_K1POWER_DIOPTERS: 44.00
OS_AXISANGLE2_DEGREES: 060

## 2024-06-05 ASSESSMENT — VISUAL ACUITY
OS_PH: 20/25+2
OS_SC: 20/40-1
OD_SC: 20/25-1

## 2024-06-05 ASSESSMENT — TONOMETRY
OS_IOP_MMHG: 15
OD_IOP_MMHG: 17

## 2024-06-11 ENCOUNTER — SURGERY/PROCEDURE (OUTPATIENT)
Dept: URBAN - METROPOLITAN AREA SURGERY 17 | Facility: SURGERY | Age: 79
End: 2024-06-11

## 2024-06-11 DIAGNOSIS — H25.812: ICD-10-CM

## 2024-06-11 PROCEDURE — 66984 XCAPSL CTRC RMVL W/O ECP: CPT | Mod: 79,LT

## 2024-06-11 ASSESSMENT — KERATOMETRY
OD_AXISANGLE_DEGREES: 020
OD_AXISANGLE2_DEGREES: 110
OS_AXISANGLE2_DEGREES: 060
OD_K2POWER_DIOPTERS: 44.75
OS_K1POWER_DIOPTERS: 44.00
OS_K2POWER_DIOPTERS: 44.50
OS_AXISANGLE_DEGREES: 150
OD_K1POWER_DIOPTERS: 44.25

## 2024-06-12 ENCOUNTER — POST-OP (OUTPATIENT)
Dept: URBAN - METROPOLITAN AREA CLINIC 29 | Facility: CLINIC | Age: 79
End: 2024-06-12

## 2024-06-12 DIAGNOSIS — Z96.1: ICD-10-CM

## 2024-06-12 PROCEDURE — 99024 POSTOP FOLLOW-UP VISIT: CPT

## 2024-06-12 ASSESSMENT — TONOMETRY
OS_IOP_MMHG: 15
OD_IOP_MMHG: 12

## 2024-06-12 ASSESSMENT — VISUAL ACUITY
OD_SC: 20/30+2
OS_SC: 20/20-1

## 2024-06-12 ASSESSMENT — KERATOMETRY
OS_K1POWER_DIOPTERS: 44.00
OS_AXISANGLE2_DEGREES: 060
OD_K1POWER_DIOPTERS: 44.25
OD_AXISANGLE2_DEGREES: 110
OD_K2POWER_DIOPTERS: 44.75
OS_K2POWER_DIOPTERS: 44.50
OD_AXISANGLE_DEGREES: 020
OS_AXISANGLE_DEGREES: 150

## 2024-06-18 ENCOUNTER — POST-OP (OUTPATIENT)
Dept: URBAN - METROPOLITAN AREA CLINIC 29 | Facility: CLINIC | Age: 79
End: 2024-06-18

## 2024-06-18 DIAGNOSIS — Z96.1: ICD-10-CM

## 2024-06-18 PROCEDURE — 99024 POSTOP FOLLOW-UP VISIT: CPT

## 2024-06-18 ASSESSMENT — KERATOMETRY
OS_AXISANGLE_DEGREES: 150
OD_AXISANGLE2_DEGREES: 110
OD_K1POWER_DIOPTERS: 44.25
OD_K2POWER_DIOPTERS: 44.75
OS_K2POWER_DIOPTERS: 44.50
OS_K1POWER_DIOPTERS: 44.00
OD_AXISANGLE_DEGREES: 020
OS_AXISANGLE2_DEGREES: 060

## 2024-06-18 ASSESSMENT — VISUAL ACUITY
OD_SC: 20/25
OS_SC: 20/30+3

## 2024-06-18 ASSESSMENT — TONOMETRY
OS_IOP_MMHG: 09
OD_IOP_MMHG: 11

## 2024-07-10 ENCOUNTER — POST-OP (OUTPATIENT)
Dept: URBAN - METROPOLITAN AREA CLINIC 29 | Facility: CLINIC | Age: 79
End: 2024-07-10

## 2024-07-10 DIAGNOSIS — Z96.1: ICD-10-CM

## 2024-07-10 PROCEDURE — 99024 POSTOP FOLLOW-UP VISIT: CPT

## 2024-07-10 RX ORDER — PREDNISOLONE ACETATE 10 MG/ML
1 SUSPENSION/ DROPS OPHTHALMIC
Start: 2024-07-10

## 2024-07-10 ASSESSMENT — KERATOMETRY
OS_K2POWER_DIOPTERS: 44.50
OS_AXISANGLE2_DEGREES: 060
OD_K2POWER_DIOPTERS: 44.75
OD_K1POWER_DIOPTERS: 44.25
OS_K1POWER_DIOPTERS: 44.00
OD_AXISANGLE_DEGREES: 020
OS_AXISANGLE_DEGREES: 150
OD_AXISANGLE2_DEGREES: 110

## 2024-07-10 ASSESSMENT — VISUAL ACUITY
OD_SC: 20/25
OS_SC: 20/40

## 2024-08-20 ENCOUNTER — POST-OP (OUTPATIENT)
Dept: URBAN - METROPOLITAN AREA CLINIC 29 | Facility: CLINIC | Age: 79
End: 2024-08-20

## 2024-08-20 DIAGNOSIS — Z96.1: ICD-10-CM

## 2024-08-20 DIAGNOSIS — H20.00: ICD-10-CM

## 2024-08-20 PROCEDURE — 99024 POSTOP FOLLOW-UP VISIT: CPT

## 2024-08-20 ASSESSMENT — KERATOMETRY
OS_K1POWER_DIOPTERS: 44.00
OD_AXISANGLE_DEGREES: 020
OS_AXISANGLE_DEGREES: 150
OS_AXISANGLE2_DEGREES: 060
OD_K1POWER_DIOPTERS: 44.25
OD_K2POWER_DIOPTERS: 44.75
OS_K2POWER_DIOPTERS: 44.50
OD_AXISANGLE2_DEGREES: 110

## 2024-08-20 ASSESSMENT — TONOMETRY
OS_IOP_MMHG: 15
OD_IOP_MMHG: 14

## 2024-08-20 ASSESSMENT — VISUAL ACUITY
OS_SC: 20/40
OD_SC: 20/30

## 2025-01-14 ENCOUNTER — FOLLOW UP (OUTPATIENT)
Age: 80
End: 2025-01-14

## 2025-01-14 DIAGNOSIS — Z96.1: ICD-10-CM

## 2025-01-14 DIAGNOSIS — H04.123: ICD-10-CM

## 2025-01-14 DIAGNOSIS — G35: ICD-10-CM

## 2025-01-14 PROCEDURE — 99214 OFFICE O/P EST MOD 30 MIN: CPT
